# Patient Record
Sex: MALE | Race: WHITE | Employment: OTHER | URBAN - METROPOLITAN AREA
[De-identification: names, ages, dates, MRNs, and addresses within clinical notes are randomized per-mention and may not be internally consistent; named-entity substitution may affect disease eponyms.]

---

## 2021-05-14 ENCOUNTER — TRANSCRIBE ORDER (OUTPATIENT)
Dept: GENERAL RADIOLOGY | Age: 86
End: 2021-05-14

## 2021-05-14 ENCOUNTER — HOSPITAL ENCOUNTER (OUTPATIENT)
Dept: ULTRASOUND IMAGING | Age: 86
Discharge: HOME | End: 2021-05-14
Attending: INTERNAL MEDICINE
Payer: MEDICARE

## 2021-05-14 DIAGNOSIS — R22.42 MASS OF LOWER LEG, LEFT: ICD-10-CM

## 2021-05-14 DIAGNOSIS — D69.6 THROMBOCYTOPENIA, UNSPECIFIED (HCC): ICD-10-CM

## 2021-05-14 DIAGNOSIS — D69.6 THROMBOCYTOPENIA, UNSPECIFIED (HCC): Primary | ICD-10-CM

## 2021-05-14 PROCEDURE — 93971 EXTREMITY STUDY: CPT

## 2021-05-24 ENCOUNTER — TRANSCRIBE ORDER (OUTPATIENT)
Dept: SCHEDULING | Age: 86
End: 2021-05-24

## 2021-05-24 DIAGNOSIS — M79.89 PAIN AND SWELLING OF RIGHT LOWER EXTREMITY: Primary | ICD-10-CM

## 2021-05-24 DIAGNOSIS — M79.604 PAIN AND SWELLING OF RIGHT LOWER EXTREMITY: Primary | ICD-10-CM

## 2021-05-28 ENCOUNTER — HOSPITAL ENCOUNTER (OUTPATIENT)
Dept: ULTRASOUND IMAGING | Age: 86
Discharge: HOME | End: 2021-05-28
Attending: INTERNAL MEDICINE
Payer: MEDICARE

## 2021-05-28 DIAGNOSIS — M79.604 PAIN AND SWELLING OF RIGHT LOWER EXTREMITY: ICD-10-CM

## 2021-05-28 DIAGNOSIS — M79.89 PAIN AND SWELLING OF RIGHT LOWER EXTREMITY: ICD-10-CM

## 2021-05-28 PROCEDURE — 93971 EXTREMITY STUDY: CPT

## 2021-08-03 ENCOUNTER — HOSPITAL ENCOUNTER (INPATIENT)
Age: 86
LOS: 1 days | Discharge: HOME | DRG: 309 | End: 2021-08-04
Attending: EMERGENCY MEDICINE | Admitting: INTERNAL MEDICINE
Payer: MEDICARE

## 2021-08-03 ENCOUNTER — APPOINTMENT (OUTPATIENT)
Dept: CT IMAGING | Age: 86
DRG: 309 | End: 2021-08-03
Attending: EMERGENCY MEDICINE
Payer: MEDICARE

## 2021-08-03 DIAGNOSIS — K86.1 CHRONIC PANCREATITIS, UNSPECIFIED PANCREATITIS TYPE (HCC): ICD-10-CM

## 2021-08-03 DIAGNOSIS — D69.6 THROMBOCYTOPENIA (HCC): ICD-10-CM

## 2021-08-03 DIAGNOSIS — K80.20 CALCULUS OF GALLBLADDER WITHOUT CHOLECYSTITIS WITHOUT OBSTRUCTION: ICD-10-CM

## 2021-08-03 DIAGNOSIS — R91.8 PULMONARY NODULES: ICD-10-CM

## 2021-08-03 DIAGNOSIS — R10.32 ABDOMINAL PAIN, LLQ (LEFT LOWER QUADRANT): ICD-10-CM

## 2021-08-03 DIAGNOSIS — I48.91 ATRIAL FIBRILLATION WITH SLOW VENTRICULAR RESPONSE (HCC): Primary | ICD-10-CM

## 2021-08-03 PROBLEM — R00.1 BRADYCARDIA: Status: ACTIVE | Noted: 2021-08-03

## 2021-08-03 LAB
ALBUMIN SERPL-MCNC: 3.6 G/DL (ref 3.5–5)
ALBUMIN/GLOB SERPL: 1.2 {RATIO} (ref 1.1–2.2)
ALP SERPL-CCNC: 68 U/L (ref 45–117)
ALT SERPL-CCNC: 20 U/L (ref 12–78)
ANION GAP SERPL CALC-SCNC: 7 MMOL/L (ref 5–15)
AST SERPL-CCNC: 18 U/L (ref 15–37)
BASOPHILS # BLD: 0 K/UL (ref 0–0.1)
BASOPHILS NFR BLD: 0 % (ref 0–1)
BILIRUB SERPL-MCNC: 1.1 MG/DL (ref 0.2–1)
BUN SERPL-MCNC: 18 MG/DL (ref 6–20)
BUN/CREAT SERPL: 16 (ref 12–20)
CALCIUM SERPL-MCNC: 8.9 MG/DL (ref 8.5–10.1)
CHLORIDE SERPL-SCNC: 101 MMOL/L (ref 97–108)
CO2 SERPL-SCNC: 28 MMOL/L (ref 21–32)
CREAT SERPL-MCNC: 1.16 MG/DL (ref 0.7–1.3)
DIFFERENTIAL METHOD BLD: ABNORMAL
EOSINOPHIL # BLD: 0.2 K/UL (ref 0–0.4)
EOSINOPHIL NFR BLD: 3 % (ref 0–7)
ERYTHROCYTE [DISTWIDTH] IN BLOOD BY AUTOMATED COUNT: 13.4 % (ref 11.5–14.5)
GLOBULIN SER CALC-MCNC: 3.1 G/DL (ref 2–4)
GLUCOSE SERPL-MCNC: 99 MG/DL (ref 65–100)
HCT VFR BLD AUTO: 42.6 % (ref 36.6–50.3)
HGB BLD-MCNC: 14.2 G/DL (ref 12.1–17)
IMM GRANULOCYTES # BLD AUTO: 0 K/UL (ref 0–0.04)
IMM GRANULOCYTES NFR BLD AUTO: 0 % (ref 0–0.5)
LACTATE SERPL-SCNC: 0.6 MMOL/L (ref 0.4–2)
LIPASE SERPL-CCNC: 67 U/L (ref 73–393)
LYMPHOCYTES # BLD: 1.1 K/UL (ref 0.8–3.5)
LYMPHOCYTES NFR BLD: 20 % (ref 12–49)
MCH RBC QN AUTO: 32.4 PG (ref 26–34)
MCHC RBC AUTO-ENTMCNC: 33.3 G/DL (ref 30–36.5)
MCV RBC AUTO: 97.3 FL (ref 80–99)
MONOCYTES # BLD: 0.5 K/UL (ref 0–1)
MONOCYTES NFR BLD: 9 % (ref 5–13)
NEUTS SEG # BLD: 3.7 K/UL (ref 1.8–8)
NEUTS SEG NFR BLD: 68 % (ref 32–75)
NRBC # BLD: 0 K/UL (ref 0–0.01)
NRBC BLD-RTO: 0 PER 100 WBC
PLATELET # BLD AUTO: 88 K/UL (ref 150–400)
PMV BLD AUTO: 12.5 FL (ref 8.9–12.9)
POTASSIUM SERPL-SCNC: 4 MMOL/L (ref 3.5–5.1)
PROT SERPL-MCNC: 6.7 G/DL (ref 6.4–8.2)
RBC # BLD AUTO: 4.38 M/UL (ref 4.1–5.7)
RBC MORPH BLD: ABNORMAL
SODIUM SERPL-SCNC: 136 MMOL/L (ref 136–145)
TROPONIN I SERPL-MCNC: <0.05 NG/ML
WBC # BLD AUTO: 5.5 K/UL (ref 4.1–11.1)

## 2021-08-03 PROCEDURE — 84484 ASSAY OF TROPONIN QUANT: CPT

## 2021-08-03 PROCEDURE — 85025 COMPLETE CBC W/AUTO DIFF WBC: CPT

## 2021-08-03 PROCEDURE — 74177 CT ABD & PELVIS W/CONTRAST: CPT

## 2021-08-03 PROCEDURE — 65660000001 HC RM ICU INTERMED STEPDOWN

## 2021-08-03 PROCEDURE — 74011000636 HC RX REV CODE- 636: Performed by: EMERGENCY MEDICINE

## 2021-08-03 PROCEDURE — 83605 ASSAY OF LACTIC ACID: CPT

## 2021-08-03 PROCEDURE — 99284 EMERGENCY DEPT VISIT MOD MDM: CPT

## 2021-08-03 PROCEDURE — 93005 ELECTROCARDIOGRAM TRACING: CPT

## 2021-08-03 PROCEDURE — 83690 ASSAY OF LIPASE: CPT

## 2021-08-03 PROCEDURE — 80053 COMPREHEN METABOLIC PANEL: CPT

## 2021-08-03 RX ADMIN — IOPAMIDOL 100 ML: 755 INJECTION, SOLUTION INTRAVENOUS at 21:03

## 2021-08-03 NOTE — ED TRIAGE NOTES
TRIAGE NOTE: Patient arrived from home with c/o LEFT lower abd pain for the past week. Denies N/V/D. Patient first sent patient over here to rule out diverticulitis.

## 2021-08-03 NOTE — ED PROVIDER NOTES
24-year-old male with history of atrial fibrillation and DVT, taking Xarelto, was sent here by patient first for imaging of his abdomen because he has had abdominal pain since March in the left lower quadrant. They tried a course of antibiotics back then and he had some relief. It was presumed he may have had diverticulitis. Shortly after the antibiotics, the pain returned and has come and gone with no lasting relief. No fever, no cough, no chest pain, no shortness of breath, no lightheadedness or dizziness, no nausea or vomiting or diarrhea. No unintentional weight loss. No blood in his stool. Normal bowel movements. Able to eat without problems. 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 His heart rate has been in the 30s and 40s while he has been here, but he says he has no symptoms from that. His cardiologist is Dr. Marvel Metcalf           Past Medical History:   Diagnosis Date    Atrial fibrillation [facility])        Past Surgical History:   Procedure Laterality Date    HX CHOLECYSTECTOMY      HX TONSIL AND ADENOIDECTOMY           History reviewed. No pertinent family history.     Social History     Socioeconomic History    Marital status:      Spouse name: Not on file    Number of children: Not on file    Years of education: Not on file    Highest education level: Not on file   Occupational History    Not on file   Tobacco Use    Smoking status: Never Smoker    Smokeless tobacco: Never Used   Substance and Sexual Activity    Alcohol use: Never    Drug use: Not on file    Sexual activity: Not on file   Other Topics Concern    Not on file   Social History Narrative    Not on file     Social Determinants of Health     Financial Resource Strain:     Difficulty of Paying Living Expenses:    Food Insecurity:     Worried About Running Out of Food in the Last Year:     [address] in the Last Year: Transportation Needs:     Lack of Transportation (Medical):  Lack of Transportation (Non-Medical):    Physical Activity:     Days of Exercise per Week:     Minutes of Exercise per Session:    Stress:     Feeling of Stress :    Social Connections:     Frequency of Communication with Friends and Family:     Frequency of Social Gatherings with Friends and Family:     Attends [employer] Services:     Active Member of Clubs or Organizations:     Attends Club or Organization Meetings:     Marital Status:    Intimate Partner Violence:     Fear of Current or Ex-Partner:     Emotionally Abused:     Physically Abused:     Sexually Abused: ALLERGIES: Patient has no known allergies. Review of Systems   Constitutional: Negative for fever. HENT: Negative for trouble swallowing. Eyes: Negative for visual disturbance. Respiratory: Negative for cough. Cardiovascular: Negative for chest pain. Gastrointestinal: Positive for abdominal pain. Genitourinary: Negative for difficulty urinating. Musculoskeletal: Negative for gait problem. Skin: Negative for rash. Neurological: Negative for headaches. Hematological: Bruises/bleeds easily. Psychiatric/Behavioral: Negative for sleep disturbance. Vitals:    08/03/21 1907   BP: (!) 152/75   Pulse: (!) 44   Resp: 17   Temp: 97.5 °F (36.4 °C)   SpO2: 99%   Weight: 85.4 kg (188 lb 4.4 oz)   Height: 5' 8\" (1.727 m)            Physical Exam  Constitutional:       Appearance: Normal appearance. HENT:      Head: Normocephalic. Nose: Nose normal.      Mouth/Throat:      Mouth: Mucous membranes are moist.   Eyes:      Extraocular Movements: Extraocular movements intact. Conjunctiva/sclera: Conjunctivae normal.   Cardiovascular:      Rate and Rhythm: Bradycardia present. Rhythm irregular. Pulmonary:      Effort: Pulmonary effort is normal. No respiratory distress. Abdominal:      General: Abdomen is flat.       Palpations: Abdomen is soft. Tenderness: There is guarding. Musculoskeletal:         General: Normal range of motion. Skin:     Findings: No rash. Neurological:      General: No focal deficit present. Mental Status: He is alert. Psychiatric:         Behavior: Behavior normal.          MDM  Number of Diagnoses or Management Options  Abdominal pain, LLQ (left lower quadrant)  Atrial fibrillation with slow ventricular response (HCC)  Calculus of gallbladder without cholecystitis without obstruction  Chronic pancreatitis, unspecified pancreatitis type (HCC)  Pulmonary nodules  Thrombocytopenia (HCC)  Diagnosis management comments: EKG at 1913  Atrial fibrillation with slow ventricular response and normal axis at a rate of 38  QRS and QTc within normal limits  No ST changes  Nonischemic    No significant tenderness on exam, but pain for this length of time is concerning. I will obtain a CT scan to rule out any serious pathology and I will also obtain a lactic acid to look for any evidence of mesenteric ischemia. Patient is well-appearing despite his extremely low heart rate. He does not seem to have symptoms from this, so I plan to discuss it with his cardiologist. I put in a page to her.       [facility] Consult for Admission  10:49 PM    ED Room Number: SER01/01  Patient Name and age:  Loreto Martínez 80 y.o.  male  Working Diagnosis: Atrial fibrillation with slow ventricular response ([address]  (primary encounter diagnosis)  Abdominal pain, LLQ (left lower quadrant)  Thrombocytopenia ([address]  Calculus of gallbladder without cholecystitis without obstruction  Chronic pancreatitis, unspecified pancreatitis type ([address]  Pulmonary nodules    COVID-19 Suspicion:  no  Sepsis present:  no  Reassessment needed: no  Code Status:  Full Code  Readmission: no  Isolation Requirements:  no  Recommended Level of Care:  telemetry  Department:[facility] - [phone]  Other: Patient came for abdominal pain, but is being admitted for heart rate in the 30s.   I spoke with Dr. Bella Nur cardiology, who recommended admission for further evaluation of his bradycardia and possibly pacer placement         Procedures

## 2021-08-04 ENCOUNTER — APPOINTMENT (OUTPATIENT)
Dept: NON INVASIVE DIAGNOSTICS | Age: 86
DRG: 309 | End: 2021-08-04
Attending: STUDENT IN AN ORGANIZED HEALTH CARE EDUCATION/TRAINING PROGRAM
Payer: MEDICARE

## 2021-08-04 VITALS
WEIGHT: 179 LBS | BODY MASS INDEX: 27.13 KG/M2 | RESPIRATION RATE: 14 BRPM | TEMPERATURE: 97.6 F | SYSTOLIC BLOOD PRESSURE: 123 MMHG | OXYGEN SATURATION: 98 % | HEIGHT: 68 IN | DIASTOLIC BLOOD PRESSURE: 58 MMHG | HEART RATE: 43 BPM

## 2021-08-04 PROBLEM — I48.91 ATRIAL FIBRILLATION (HCC): Status: ACTIVE | Noted: 2021-08-04

## 2021-08-04 LAB
AMPHET UR QL SCN: NEGATIVE
ATRIAL RATE: 357 BPM
BARBITURATES UR QL SCN: NEGATIVE
BENZODIAZ UR QL: NEGATIVE
CALCULATED R AXIS, ECG10: -22 DEGREES
CALCULATED T AXIS, ECG11: 55 DEGREES
CANNABINOIDS UR QL SCN: NEGATIVE
COCAINE UR QL SCN: NEGATIVE
DIAGNOSIS, 93000: NORMAL
DRUG SCRN COMMENT,DRGCM: NORMAL
ECHO AO ROOT DIAM: 3.56 CM
ECHO AV AREA PEAK VELOCITY: 3.03 CM2
ECHO AV AREA/BSA PEAK VELOCITY: 1.6 CM2/M2
ECHO AV PEAK GRADIENT: 4.45 MMHG
ECHO AV PEAK VELOCITY: 105.47 CM/S
ECHO LA AREA 4C: 40.42 CM2
ECHO LA MAJOR AXIS: 5.42 CM
ECHO LA MINOR AXIS: 2.78 CM
ECHO LA VOL 2C: 133.23 ML (ref 18–58)
ECHO LA VOL 4C: 151.81 ML (ref 18–58)
ECHO LA VOL BP: 160.99 ML (ref 18–58)
ECHO LA VOL/BSA BIPLANE: 82.56 ML/M2 (ref 16–28)
ECHO LA VOLUME INDEX A2C: 68.32 ML/M2 (ref 16–28)
ECHO LA VOLUME INDEX A4C: 77.85 ML/M2 (ref 16–28)
ECHO LV E' LATERAL VELOCITY: 12.16 CM/S
ECHO LV E' SEPTAL VELOCITY: 7.28 CM/S
ECHO LV INTERNAL DIMENSION DIASTOLIC: 5.35 CM (ref 4.2–5.9)
ECHO LV INTERNAL DIMENSION SYSTOLIC: 3.69 CM
ECHO LV IVSD: 1.14 CM (ref 0.6–1)
ECHO LV MASS 2D: 257.5 G (ref 88–224)
ECHO LV MASS INDEX 2D: 132 G/M2 (ref 49–115)
ECHO LV POSTERIOR WALL DIASTOLIC: 1.24 CM (ref 0.6–1)
ECHO LVOT DIAM: 2.41 CM
ECHO LVOT PEAK GRADIENT: 1.96 MMHG
ECHO LVOT PEAK VELOCITY: 69.98 CM/S
ECHO MV A VELOCITY: 41.46 CM/S
ECHO MV AREA PHT: 4.48 CM2
ECHO MV E DECELERATION TIME (DT): 169.3 MS
ECHO MV E VELOCITY: 94.6 CM/S
ECHO MV E/A RATIO: 2.28
ECHO MV E/E' LATERAL: 7.78
ECHO MV E/E' RATIO (AVERAGED): 10.39
ECHO MV E/E' SEPTAL: 12.99
ECHO MV PRESSURE HALF TIME (PHT): 49.1 MS
ECHO PV MAX VELOCITY: 89.82 CM/S
ECHO PV PEAK INSTANTANEOUS GRADIENT SYSTOLIC: 3.23 MMHG
ECHO RV INTERNAL DIMENSION: 5.61 CM
ECHO RV TAPSE: 2.26 CM (ref 1.5–2)
ECHO TV REGURGITANT MAX VELOCITY: 361.05 CM/S
ECHO TV REGURGITANT PEAK GRADIENT: 52.14 MMHG
MAGNESIUM SERPL-MCNC: 2 MG/DL (ref 1.6–2.4)
METHADONE UR QL: NEGATIVE
OPIATES UR QL: NEGATIVE
PCP UR QL: NEGATIVE
PHOSPHATE SERPL-MCNC: 3.3 MG/DL (ref 2.6–4.7)
Q-T INTERVAL, ECG07: 508 MS
QRS DURATION, ECG06: 100 MS
QTC CALCULATION (BEZET), ECG08: 403 MS
TSH SERPL DL<=0.05 MIU/L-ACNC: 7.89 UIU/ML (ref 0.36–3.74)
VENTRICULAR RATE, ECG03: 38 BPM

## 2021-08-04 PROCEDURE — 74011250637 HC RX REV CODE- 250/637: Performed by: INTERNAL MEDICINE

## 2021-08-04 PROCEDURE — 36415 COLL VENOUS BLD VENIPUNCTURE: CPT

## 2021-08-04 PROCEDURE — 93306 TTE W/DOPPLER COMPLETE: CPT

## 2021-08-04 PROCEDURE — 74011250636 HC RX REV CODE- 250/636: Performed by: STUDENT IN AN ORGANIZED HEALTH CARE EDUCATION/TRAINING PROGRAM

## 2021-08-04 PROCEDURE — 84443 ASSAY THYROID STIM HORMONE: CPT

## 2021-08-04 PROCEDURE — 83735 ASSAY OF MAGNESIUM: CPT

## 2021-08-04 PROCEDURE — 84100 ASSAY OF PHOSPHORUS: CPT

## 2021-08-04 PROCEDURE — 80307 DRUG TEST PRSMV CHEM ANLYZR: CPT

## 2021-08-04 RX ORDER — SODIUM CHLORIDE 9 MG/ML
100 INJECTION, SOLUTION INTRAVENOUS CONTINUOUS
Status: DISCONTINUED | OUTPATIENT
Start: 2021-08-04 | End: 2021-08-04 | Stop reason: HOSPADM

## 2021-08-04 RX ORDER — ONDANSETRON 4 MG/1
4 TABLET, ORALLY DISINTEGRATING ORAL
Status: DISCONTINUED | OUTPATIENT
Start: 2021-08-04 | End: 2021-08-04 | Stop reason: HOSPADM

## 2021-08-04 RX ORDER — ACETAMINOPHEN 325 MG/1
650 TABLET ORAL
Status: DISCONTINUED | OUTPATIENT
Start: 2021-08-04 | End: 2021-08-04 | Stop reason: HOSPADM

## 2021-08-04 RX ORDER — SODIUM CHLORIDE 0.9 % (FLUSH) 0.9 %
5-40 SYRINGE (ML) INJECTION AS NEEDED
Status: DISCONTINUED | OUTPATIENT
Start: 2021-08-04 | End: 2021-08-04 | Stop reason: HOSPADM

## 2021-08-04 RX ORDER — ONDANSETRON 2 MG/ML
4 INJECTION INTRAMUSCULAR; INTRAVENOUS
Status: DISCONTINUED | OUTPATIENT
Start: 2021-08-04 | End: 2021-08-04 | Stop reason: HOSPADM

## 2021-08-04 RX ORDER — POLYETHYLENE GLYCOL 3350 17 G/17G
17 POWDER, FOR SOLUTION ORAL DAILY PRN
Status: DISCONTINUED | OUTPATIENT
Start: 2021-08-04 | End: 2021-08-04 | Stop reason: HOSPADM

## 2021-08-04 RX ORDER — SODIUM CHLORIDE 0.9 % (FLUSH) 0.9 %
5-40 SYRINGE (ML) INJECTION EVERY 8 HOURS
Status: DISCONTINUED | OUTPATIENT
Start: 2021-08-04 | End: 2021-08-04 | Stop reason: HOSPADM

## 2021-08-04 RX ORDER — ACETAMINOPHEN 650 MG/1
650 SUPPOSITORY RECTAL
Status: DISCONTINUED | OUTPATIENT
Start: 2021-08-04 | End: 2021-08-04 | Stop reason: HOSPADM

## 2021-08-04 RX ADMIN — RIVAROXABAN 15 MG: 15 TABLET, FILM COATED ORAL at 11:22

## 2021-08-04 RX ADMIN — SODIUM CHLORIDE 100 ML/HR: 9 INJECTION, SOLUTION INTRAVENOUS at 04:49

## 2021-08-04 NOTE — DISCHARGE SUMMARY
Discharge Summary       PATIENT ID: Modesto Schmidt  MRN: [id number]   YOB: 1932    DATE OF ADMISSION: 8/3/2021  6:51 PM    DATE OF DISCHARGE: 08/04/2021   PRIMARY CARE PROVIDER: None     DISCHARGING PROVIDER: Joshua Mora MD    To contact this individual call [phone] and ask the  to page. If unavailable ask to be transferred the Adult Hospitalist Department. CONSULTATIONS: IP CONSULT TO CARDIOLOGY    PROCEDURES/SURGERIES: * No surgery found *    ADMITTING DIAGNOSES & HOSPITAL COURSE:   Afib with slow ventricular response  Abdominal pain -resolved     Modesto Schmidt is a 80 y.o. male w/ hx of afib on xarelto who presented to er with complaints of abdominal pain. Reported intermittent lmq abdominal pain which prompted him to seek er care due to concerns of diverticulitis. At time of my consultation, denied any symptoms. Was incidentally found to be bradycardic w/ hr to 30s. Denies any symptoms at this time. 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 The patient denies any fever, chills, chest pain, cough, congestion, recent illness, palpitations, or dysuria. Patient admitted and monitored. Cardiology evaluated, and no indication for intervention. Pt is cleared for DC home. DISCHARGE DIAGNOSES / PLAN:         Atrial Fibrillation w/ Slow Ventricular Response  -Repeat TSH, CMP, Mag, Phos, UDS  -Monitor on telemetry  -ECHO in AM  -Cardiology consult     Abdominal Pain  -No clear abdominal pathology on ct  -Asymptomatic. Monitor     Chronic cholelithiasis w/o cholecystitis,  Chronic calcific pancreatitis  Sub centimeter nodules in lung bases  -Outpatient PCP follow-up        ADDITIONAL CARE RECOMMENDATIONS:   - Please take all medications as prescribed. Note changes as below. **no changes    - Please make sure to follow up with your primary care physician within 1-2 weeks of discharge for hospital follow up.    - Please make sure to continue to monitor for: Chest pain, shortness of breath, high fevers, feeling like you are going to pass out and return to the Emergency Department with any of these symptoms.   - Please get up slowly from a seated or laying position, avoid falls. - Avoid tobacco, alcohol and other illicit drug use. - Diet restrictions: None  - Activity restrictions: None        PENDING TEST RESULTS:   At the time of discharge the following test results are still pending: None    FOLLOW UP APPOINTMENTS:    Follow-up Information     Follow up With Specialties Details Why Contact Info    Primary care physician    As previously scheduled. DIET: Resume previous diet     ACTIVITY: Activity as tolerated    WOUND CARE: None    EQUIPMENT needed: None      DISCHARGE MEDICATIONS:  Current Discharge Medication List      CONTINUE these medications which have NOT CHANGED    Details   rivaroxaban (Xarelto) 10 mg tablet Take 10 mg by mouth daily. NOTIFY YOUR PHYSICIAN FOR ANY OF THE FOLLOWING:   Fever over 101 degrees for 24 hours. Chest pain, shortness of breath, fever, chills, nausea, vomiting, diarrhea, change in mentation, falling, weakness, bleeding. Severe pain or pain not relieved by medications. Or, any other signs or symptoms that you may have questions about.     DISPOSITION:   X Home With:   OT  PT  HH  RN       Long term SNF/Inpatient Rehab    Independent/assisted living    Hospice    Other:       PATIENT CONDITION AT DISCHARGE:     Functional status    Poor     Deconditioned    X Independent      Cognition    X Lucid     Forgetful     Dementia      Catheters/lines (plus indication)    [facility]     PICC     PEG    X None      Code status   X  Full code     DNR      PHYSICAL EXAMINATION AT DISCHARGE:  Visit Vitals  BP (!) 123/58   Pulse (!) 43   Temp 97.6 °F (36.4 °C)   Resp 14   Ht 5' 8\" (1.727 m)   Wt 81.2 kg (179 lb)   SpO2 98%   BMI 27.22 kg/m²     Gen: NAD, awake in bed  HEENT: NC/AT, sclera anicteric, PERRL, EOMI  CV: Irregular, bradycardic. no m/r/g, normal S1 and S2, no pedal edema   Resp: CTA b/l no increased work of breathing, no wheezing or rhonchi, speaking in full sentences   Abd: NT/ND, normal bowel sounds, no rebound or guarding  Ext: 2+ pulses, no edema  Skin: No rashes or lesions        CHRONIC MEDICAL DIAGNOSES:  Problem List as of 8/4/2021 Date Reviewed: 8/4/2021        Codes Class Noted - Resolved    Atrial fibrillation ([facility] [address] [facility] ICD-10-CM: I48.91  ICD-9-CM: 427.31  8/4/2021 - Present        Bradycardia ICD-10-CM: R00.1  ICD-9-CM: 427.89  8/3/2021 - Present              Greater than 30 minutes were spent with the patient on counseling and coordination of care    Signed:   Morena Talamantes MD  8/4/2021  12:12 PM

## 2021-08-04 NOTE — PROGRESS NOTES
Chart reviewed and attempted to see patient for OT intervention. Patient fully clothed and up ad lilly to bathroom. Patient endorsed he was independent with all aspects of ADLs and IADLs and still working. No acute OT needs at this time. Please re-consult if there is a change in patient functional status. Thank you.

## 2021-08-04 NOTE — PROGRESS NOTES
Transition of Care: home with wife and followup with specialist and new PCP (patient will make his own appointment)    Transport Plan: in car with wife    RUR: 9%    Main contact is Kaela Hobson [phone]    CM noted discharge order; no other CM needs noted    1200: this CM met with patient; he is alert and oriented x 4; he is independent in his ADLs; patient will be picked up by his wife; patient states he just moved to town recently; this CM gave him a list of [facility] PCPs; patient states he will look over list and chose a PCP and make his own appointment; no other CM needs noted    Medicare pt has received, reviewed, and signed [facility]st [facility] letter informing them of their right to appeal the discharge. Signed copy has been placed on pt bedside chart.     CM following  Lashay Tobin RN, CRM

## 2021-08-04 NOTE — H&P
History & Physical    Primary Care Provider: None  Source of Information: Patient and chart review    History of Presenting Illness:   Annika Bundy is a 80 y.o. male w/ hx of afib on xarelto who presented to er with complaints of abdominal pain. Reported intermittent lmq abdominal pain which prompted him to seek er care due to concerns of diverticulitis. At time of my consultation, denied any symptoms. Was incidentally found to be bradycardic w/ hr to 30s. Denies any symptoms at this time. 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 The patient denies any fever, chills, chest pain, cough, congestion, recent illness, palpitations, or dysuria. Vitals on ER presentation remarkable for bradycardia with HR to 32 and BPs to 161/104  Labs remarkable for plt of 88  CT abd et pelv w/ contrast showed no acute pathology. Chronic cholelithiasis w/o cholecystitis, chronic calcific pancreatitis, multiple sub centimeter nodules in lung bases. Review of Systems:  A comprehensive review of systems was negative except for that written in the History of Present Illness. Past Medical History:   Diagnosis Date    Atrial fibrillation [facility])       Past Surgical History:   Procedure Laterality Date    HX CHOLECYSTECTOMY      HX TONSIL AND ADENOIDECTOMY       Prior to Admission medications    Medication Sig Start Date End Date Taking? Authorizing Provider   rivaroxaban (Xarelto) 10 mg tablet Take 10 mg by mouth daily. Yes Other, MD Charlette     No Known Allergies   History reviewed. No pertinent family history.      SOCIAL HISTORY:  Patient resides:  Independently X   Assisted Living    SNF    With family care       Smoking history:   None X   Former    Chronic      Alcohol history:   None X   Social    Chronic      Ambulates:   Independently X   w/cane    w/walker    w/wc    CODE STATUS:  DNR    Full X   Other      Objective:     Physical Exam:     Visit Vitals  BP (!) 162/61 (BP 1 Location: Left upper arm, BP Patient Position: At rest)   Pulse (!) 45   Temp 98.7 °F (37.1 °C)   Resp 15   Ht 5' 8\" (1.727 m)   Wt 85.4 kg (188 lb 4.4 oz)   SpO2 98%   BMI 28.63 kg/m²      O2 Device: None (Room air)    General:  Alert, cooperative, no distress, appears stated age. Head:  Normocephalic, without obvious abnormality, atraumatic. Eyes:  Conjunctivae/corneas clear. PERRL, EOMs intact. Nose: Nares normal. Septum midline. Mucosa normal.        Neck: Supple, symmetrical, trachea midline, no carotid bruit and no JVD. Lungs:   Clear to auscultation bilaterally. Chest wall:  No tenderness or deformity. Heart:  Regular irregular, bradycardic, S1, S2 normal, no murmur, click, rub or gallop. Abdomen:   Soft, non-tender. Bowel sounds normal. No masses,  No organomegaly. Extremities: Extremities normal, atraumatic, no cyanosis or edema. Pulses: 2+ and symmetric all extremities. Skin: Skin color, texture, turgor normal. No rashes or lesions   Neurologic: CNII-XII intact. EKG:  afib w/ SVR  Data Review:     Recent Days:  Recent Labs     08/03/21 1914   WBC 5.5   HGB 14.2   HCT 42.6   PLT 88*     Recent Labs     08/03/21 1914      K 4.0      CO2 28   GLU 99   BUN 18   CREA 1.16   CA 8.9   ALB 3.6   ALT 20     No results for input(s): PH, PCO2, PO2, HCO3, FIO2 in the last 72 hours.     24 Hour Results:  Recent Results (from the past 24 hour(s))   EKG, 12 LEAD, INITIAL    Collection Time: 08/03/21  7:13 PM   Result Value Ref Range    Ventricular Rate 38 BPM    Atrial Rate 357 BPM    QRS Duration 100 ms    Q-T Interval 508 ms    QTC Calculation (Bezet) 403 ms    Calculated R Axis -22 degrees    Calculated T Axis 55 degrees    Diagnosis       Atrial fibrillation with slow ventricular response  Abnormal ECG  No previous ECGs available     LACTIC ACID    Collection Time: 08/03/21  7:14 PM   Result Value Ref Range    Lactic acid 0.6 0.4 - 2.0 MMOL/L CBC WITH AUTOMATED DIFF    Collection Time: 08/03/21  7:14 PM   Result Value Ref Range    WBC 5.5 4.1 - 11.1 K/uL    RBC 4.38 4.10 - 5.70 M/uL    HGB 14.2 12.1 - 17.0 g/dL    HCT 42.6 36.6 - 50.3 %    MCV 97.3 80.0 - 99.0 FL    MCH 32.4 26.0 - 34.0 PG    MCHC 33.3 30.0 - 36.5 g/dL    RDW 13.4 11.5 - 14.5 %    PLATELET 88 (L) 356 - 400 K/uL    MPV 12.5 8.9 - 12.9 FL    NRBC 0.0 0  WBC    ABSOLUTE NRBC 0.00 0.00 - 0.01 K/uL    NEUTROPHILS 68 32 - 75 %    LYMPHOCYTES 20 12 - 49 %    MONOCYTES 9 5 - 13 %    EOSINOPHILS 3 0 - 7 %    BASOPHILS 0 0 - 1 %    IMMATURE GRANULOCYTES 0 0.0 - 0.5 %    ABS. NEUTROPHILS 3.7 1.8 - 8.0 K/UL    ABS. LYMPHOCYTES 1.1 0.8 - 3.5 K/UL    ABS. MONOCYTES 0.5 0.0 - 1.0 K/UL    ABS. EOSINOPHILS 0.2 0.0 - 0.4 K/UL    ABS. BASOPHILS 0.0 0.0 - 0.1 K/UL    ABS. IMM. GRANS. 0.0 0.00 - 0.04 K/UL    DF SMEAR SCANNED      RBC COMMENTS NORMOCYTIC, NORMOCHROMIC     LIPASE    Collection Time: 08/03/21  7:14 PM   Result Value Ref Range    Lipase 67 (L) 73 - 390 U/L   METABOLIC PANEL, COMPREHENSIVE    Collection Time: 08/03/21  7:14 PM   Result Value Ref Range    Sodium 136 136 - 145 mmol/L    Potassium 4.0 3.5 - 5.1 mmol/L    Chloride 101 97 - 108 mmol/L    CO2 28 21 - 32 mmol/L    Anion gap 7 5 - 15 mmol/L    Glucose 99 65 - 100 mg/dL    BUN 18 6 - 20 MG/DL    Creatinine 1.16 0.70 - 1.30 MG/DL    BUN/Creatinine ratio 16 12 - 20      GFR est AA >60 >60 ml/min/1.73m2    GFR est non-AA 59 (L) >60 ml/min/1.73m2    Calcium 8.9 8.5 - 10.1 MG/DL    Bilirubin, total 1.1 (H) 0.2 - 1.0 MG/DL    ALT (SGPT) 20 12 - 78 U/L    AST (SGOT) 18 15 - 37 U/L    Alk.  phosphatase 68 45 - 117 U/L    Protein, total 6.7 6.4 - 8.2 g/dL    Albumin 3.6 3.5 - 5.0 g/dL    Globulin 3.1 2.0 - 4.0 g/dL    A-G Ratio 1.2 1.1 - 2.2     TROPONIN I    Collection Time: 08/03/21  7:14 PM   Result Value Ref Range    Troponin-I, Qt. <0.05 <0.05 ng/mL         Imaging:     Assessment:     Jordin Lea is a 80 y.o. male w/ hx of afib on xarelto who is admitted for afib w/ svr. Plan:       Atrial Fibrillation w/ Slow Ventricular Response  -Repeat TSH, CMP, Mag, Phos, UDS  -Monitor on telemetry  -ECHO in AM  -Cardiology consult    Abdominal Pain  -No clear abdominal pathology on ct  -Asymptomatic. Monitor    Chronic cholelithiasis w/o cholecystitis,  Chronic calcific pancreatitis  Sub centimeter nodules in lung bases  -Outpatient PCP follow-up            FEN/GI -  NPO.  NS @ 75 ml/hr  Activity - as tolerated  DVT prophylaxis - Xarelto  GI prophylaxis -  NI  Disposition - Home    CODE STATUS:  Full code       Signed By: Britany Mann MD     August 4, 2021

## 2021-08-04 NOTE — CONSULTS
S Cardiology Consult Note    Date of consult:  08/04/21  Date of admission: 8/3/2021  Primary Cardiologist: Dr Fritz Carty  Physician Requesting consult: Dr Heather Dorsey / Reason for consult: slow a.fib    History of the presenting illness:  Holley Rodriguez is a 80 y.o. with a known history of atrial fibrillation, who presented to the [facility] ER yesterday with left sided abdominal pain. He has had pain in the left lower quadrant for a couple of weeks now. It was getting worse yesterday so he decided to get it checked. While he was there, they noted his HR was very slow, so decided to transfer him to [facility] for observation. Regarding his HR he is entirely asymptomatic. No syncope / dizziness. No exercise intolerance. No chest pain. Other than is abdominal discomfort he feels fine. Past Medical History:   Diagnosis Date    Atrial fibrillation [facility])        Prior to Admission medications    Medication Sig Start Date End Date Taking? Authorizing Provider   rivaroxaban (Xarelto) 10 mg tablet Take 10 mg by mouth daily. Yes Other, MD Charlette       No Known Allergies     History reviewed. No pertinent family history.     Social History     Socioeconomic History    Marital status:      Spouse name: Not on file    Number of children: Not on file    Years of education: Not on file    Highest education level: Not on file   Occupational History    Not on file   Tobacco Use    Smoking status: Never Smoker    Smokeless tobacco: Never Used   Substance and Sexual Activity    Alcohol use: Never    Drug use: Not on file    Sexual activity: Not on file   Other Topics Concern    Not on file   Social History Narrative    Not on file     Social Determinants of Health     Financial Resource Strain:     Difficulty of Paying Living Expenses:    Food Insecurity:     Worried About Running Out of Food in the Last Year:     [address] in the Last Year:    Transportation Needs:     Lack of Transportation (Medical):  Lack of Transportation (Non-Medical):    Physical Activity:     Days of Exercise per Week:     Minutes of Exercise per Session:    Stress:     Feeling of Stress :    Social Connections:     Frequency of Communication with Friends and Family:     Frequency of Social Gatherings with Friends and Family:     Attends Jainism Services:     Active Member of Clubs or Organizations:     Attends Club or Organization Meetings:     Marital Status:    Intimate Partner Violence:     Fear of Current or Ex-Partner:     Emotionally Abused:     Physically Abused:     Sexually Abused:        Review of Systems   Constitutional: Negative for chills and fever. HENT: Negative for hearing loss and nosebleeds. Eyes: Negative for blurred vision and double vision. Respiratory: Negative for cough and sputum production. Cardiovascular: Negative for chest pain and palpitations. Gastrointestinal: Positive for abdominal pain. Negative for nausea and vomiting. Genitourinary: Negative for frequency and urgency. Musculoskeletal: Negative for back pain and joint pain. Skin: Negative for itching and rash. Neurological: Negative for dizziness, loss of consciousness, weakness and headaches. Endo/Heme/Allergies: Negative for environmental allergies. Does not bruise/bleed easily. Visit Vitals  BP (!) 176/74 (BP 1 Location: Left upper arm, BP Patient Position: Sitting)   Pulse (!) 41   Temp 97.7 °F (36.5 °C)   Resp 19   Ht 5' 8\" (1.727 m)   Wt 81.5 kg (179 lb 10.8 oz)   SpO2 96%   BMI 27.32 kg/m²     Physical Exam  HENT:      Head: Normocephalic and atraumatic. Eyes:      General: No scleral icterus. Conjunctiva/sclera: Conjunctivae normal.   Neck:      Vascular: No JVD. Cardiovascular:      Rate and Rhythm: Bradycardia present. Rhythm irregularly irregular. Heart sounds: Normal heart sounds. No murmur heard. No gallop.     Pulmonary:      Effort: Pulmonary effort is normal. No respiratory distress. Breath sounds: Normal breath sounds. No stridor. No wheezing. Abdominal:      General: There is no distension. Palpations: Abdomen is soft. Musculoskeletal:         General: No deformity. Normal range of motion. Skin:     General: Skin is warm and dry. Neurological:      Mental Status: He is alert and oriented to person, place, and time. Psychiatric:         Mood and Affect: Mood and affect normal.         Lab review:  BMP:   Lab Results   Component Value Date/Time     08/03/2021 07:14 PM    K 4.0 08/03/2021 07:14 PM     08/03/2021 07:14 PM    CO2 28 08/03/2021 07:14 PM    AGAP 7 08/03/2021 07:14 PM    GLU 99 08/03/2021 07:14 PM    BUN 18 08/03/2021 07:14 PM    CREA 1.16 08/03/2021 07:14 PM    GFRAA >60 08/03/2021 07:14 PM    GFRNA 59 (L) 08/03/2021 07:14 PM        CBC:  Lab Results   Component Value Date/Time    WBC 5.5 08/03/2021 07:14 PM    HGB 14.2 08/03/2021 07:14 PM    HCT 42.6 08/03/2021 07:14 PM    PLATELET 88 (L) 22/04/6861 07:14 PM    MCV 97.3 08/03/2021 07:14 PM       All Cardiac Markers in the last 24 hours:    Lab Results   Component Value Date/Time    TROIQ <0.05 08/03/2021 07:14 PM       No results found for: CHOL, CHOLPOCT, CHOLX, CHLST, CHOLV, HDL, HDLPOC, HDLP, LDL, LDLCPOC, LDLC, DLDLP, VLDLC, VLDL, TGLX, TRIGL, TRIGP, TGLPOCT, CHHD, CHHDX     Data review:  EKG tracing personally reviewed: Daryn, ventricular rate 38 bpm  Telemetry: currently around 45pm in nimacarmen. CT Abdo/Pelvis  IMPRESSION  1. No acute findings. 2.  Cholelithiasis without acute cholecystitis. 3.  Chronic calcific pancreatitis. 4.  Diverticulosis coli without acute diverticulitis. 5.  Multiple subcentimeter nodules in the lung bases. Consider dedicated  nonemergent CT chest on outpatient basis for more comprehensive evaluation      Assessment:    1. Abdominal pain ? Cause  2. Asymptomatic bradycardia / sick sinus syndrome  3. Permanent atrial fibrillation   4. HTN    Recommendations / Plan:    No specific therapy needed  Does not meet indication for pacemaker implant (since this is asymptomatic bradycardia)  Avoid AV david blocking agents such as BB, CCB  Continue Xarelto    Can be discharged home from CV standpoint and f/u with Dr Teresa Waite as previously scheduled. Signed:  Altagracia PARKS  [facility]  Interventional Cardiology  08/04/21

## 2021-08-04 NOTE — PROGRESS NOTES
Bedside and Verbal shift change report given to April  Rn    (oncoming nurse) by Hernesto Gomez (offgoing nurse). Report included the following information Kardex, ED Summary, Intake/Output, MAR, Recent Results and Cardiac Rhythm A-FIB.

## 2021-08-04 NOTE — PROGRESS NOTES
Problem: General Medical Care Plan  Goal: *Vital signs within specified parameters  Outcome: Resolved/Met  Goal: *Labs within defined limits  Outcome: Resolved/Met  Goal: *Absence of infection signs and symptoms  Outcome: Resolved/Met  Goal: *Optimal pain control at patient's stated goal  Outcome: Resolved/Met  Goal: *Skin integrity maintained  Outcome: Resolved/Met  Goal: *Fluid volume balance  Outcome: Resolved/Met  Goal: *Optimize nutritional status  Outcome: Resolved/Met  Goal: *Anxiety reduced or absent  Outcome: Resolved/Met  Goal: *Progressive mobility and function (eg: ADL's)  Outcome: Resolved/Met     Problem: Patient Education: Go to Patient Education Activity  Goal: Patient/Family Education  Outcome: Resolved/Met     Problem: Falls - Risk of  Goal: *Absence of Falls  Description: Document Dawson Fall Risk and appropriate interventions in the flowsheet.   Outcome: Resolved/Met     Problem: Patient Education: Go to Patient Education Activity  Goal: Patient/Family Education  Outcome: Resolved/Met     Problem: Discharge Planning  Goal: *Discharge to safe environment  Outcome: Resolved/Met  Goal: *Knowledge of medication management  Outcome: Resolved/Met  Goal: *Knowledge of discharge instructions  Outcome: Resolved/Met     Problem: Patient Education: Go to Patient Education Activity  Goal: Patient/Family Education  Outcome: Resolved/Met

## 2021-08-04 NOTE — DISCHARGE INSTRUCTIONS
Dear Patrick Eduardo,    Thank you for choosing [address] [place] [address] for your healthcare needs. We strive to provide EXCELLENT care to you and your family. In an effort to explain clearly why you were here in the hospital, I've also written a very brief summary below. Other details including formal diagnosis, medication changes, and follow up appointment recommendations can also be found in this packet. You were admitted for slow heart rate due to physiologic (has been this way for a long time) for which you closely monitored. You also received care from specialist physicians in the following specialties:  [address]    Here are the updates to your medication list:  **no changes      Remember that it is important for you to take your medications exactly as they are prescribed. It is helpful to keep a list of your medication with the names, dosages, and times to be taken in your wallet. Additionally,   - Please make sure to follow up with your primary care physician within 1-2 weeks of discharge for hospital follow up. - Please make sure to continue to monitor for: Chest pain, shortness of breath, high fevers, feeling like you are going to pass out and return to the Emergency Department with any of these symptoms.   - Please get up slowly from a seated or laying position, avoid falls. - Avoid tobacco, alcohol and other illicit drug use. - Diet restrictions: None  - Activity restrictions: None    Make sure to also see your primary care doctor for follow-up. Bring these papers with you and be sure to review your medication list with your doctor. I cannot stress the importance of follow up enough. I've included the information for your follow-up appointments below: Follow-up Information     Follow up With Specialties Details Why Contact Info    Primary care physician    As previously scheduled.               Should you have any fever over 101 degrees for 24 hours, chest pain, shortness of breath, fever, chills, nausea, vomiting, diarrhea, change in mentation, falling, weakness, bleeding, or worsening pain, please seek medical attention immediately. Finally, as your discharging physician, you may be receiving a survey regarding my care. I would greatly value and appreciate your input in the survey as we strive for excellence. If you have any questions, I can be reached at [phone].   Thank you so much again for allowing me to care for you at [facility].    Respectfully yours,  Eliezer Leung MD

## 2021-08-04 NOTE — ED NOTES
Pt ambulated to BR with steady gait, waiting on admit room assignment. Pt updated on POC, verbalizes knowledge.

## 2021-08-26 ENCOUNTER — OFFICE VISIT (OUTPATIENT)
Dept: PRIMARY CARE CLINIC | Age: 86
End: 2021-08-26
Payer: MEDICARE

## 2021-08-26 VITALS
RESPIRATION RATE: 17 BRPM | HEIGHT: 68 IN | HEART RATE: 66 BPM | DIASTOLIC BLOOD PRESSURE: 75 MMHG | SYSTOLIC BLOOD PRESSURE: 127 MMHG | WEIGHT: 183.4 LBS | OXYGEN SATURATION: 97 % | BODY MASS INDEX: 27.8 KG/M2 | TEMPERATURE: 97.5 F

## 2021-08-26 DIAGNOSIS — G89.29 CHRONIC MIDLINE LOW BACK PAIN WITHOUT SCIATICA: ICD-10-CM

## 2021-08-26 DIAGNOSIS — K57.90 DIVERTICULOSIS: Primary | ICD-10-CM

## 2021-08-26 DIAGNOSIS — Z87.442 HISTORY OF KIDNEY STONES: ICD-10-CM

## 2021-08-26 DIAGNOSIS — M25.552 HIP PAIN, CHRONIC, LEFT: ICD-10-CM

## 2021-08-26 DIAGNOSIS — M54.50 CHRONIC MIDLINE LOW BACK PAIN WITHOUT SCIATICA: ICD-10-CM

## 2021-08-26 DIAGNOSIS — G89.29 HIP PAIN, CHRONIC, LEFT: ICD-10-CM

## 2021-08-26 DIAGNOSIS — R91.1 LUNG NODULE: ICD-10-CM

## 2021-08-26 DIAGNOSIS — I48.91 ATRIAL FIBRILLATION, UNSPECIFIED TYPE (HCC): ICD-10-CM

## 2021-08-26 DIAGNOSIS — D69.6 THROMBOCYTOPENIA (HCC): ICD-10-CM

## 2021-08-26 DIAGNOSIS — L57.0 KERATOSIS: ICD-10-CM

## 2021-08-26 DIAGNOSIS — K86.1 CHRONIC PANCREATITIS, UNSPECIFIED PANCREATITIS TYPE (HCC): ICD-10-CM

## 2021-08-26 DIAGNOSIS — K80.20 GALLSTONES: ICD-10-CM

## 2021-08-26 DIAGNOSIS — N40.0 BENIGN PROSTATIC HYPERPLASIA, UNSPECIFIED WHETHER LOWER URINARY TRACT SYMPTOMS PRESENT: ICD-10-CM

## 2021-08-26 DIAGNOSIS — J06.9 VIRAL URI: ICD-10-CM

## 2021-08-26 DIAGNOSIS — R10.32 LEFT LOWER QUADRANT ABDOMINAL PAIN: ICD-10-CM

## 2021-08-26 DIAGNOSIS — Z87.39 HISTORY OF GOUT: ICD-10-CM

## 2021-08-26 LAB
BILIRUB UR QL STRIP: NEGATIVE
GLUCOSE UR-MCNC: NEGATIVE MG/DL
KETONES P FAST UR STRIP-MCNC: NEGATIVE MG/DL
PH UR STRIP: 8 [PH] (ref 4.6–8)
PROT UR QL STRIP: NEGATIVE
SP GR UR STRIP: 1.02 (ref 1–1.03)
UA UROBILINOGEN AMB POC: NORMAL (ref 0.2–1)
URINALYSIS CLARITY POC: CLEAR
URINALYSIS COLOR POC: YELLOW
URINE BLOOD POC: NEGATIVE
URINE LEUKOCYTES POC: NEGATIVE
URINE NITRITES POC: NEGATIVE

## 2021-08-26 PROCEDURE — 99204 OFFICE O/P NEW MOD 45 MIN: CPT | Performed by: INTERNAL MEDICINE

## 2021-08-26 PROCEDURE — G8427 DOCREV CUR MEDS BY ELIG CLIN: HCPCS | Performed by: INTERNAL MEDICINE

## 2021-08-26 PROCEDURE — 81003 URINALYSIS AUTO W/O SCOPE: CPT | Performed by: INTERNAL MEDICINE

## 2021-08-26 PROCEDURE — G8510 SCR DEP NEG, NO PLAN REQD: HCPCS | Performed by: INTERNAL MEDICINE

## 2021-08-26 PROCEDURE — 1111F DSCHRG MED/CURRENT MED MERGE: CPT | Performed by: INTERNAL MEDICINE

## 2021-08-26 PROCEDURE — G8536 NO DOC ELDER MAL SCRN: HCPCS | Performed by: INTERNAL MEDICINE

## 2021-08-26 PROCEDURE — G8419 CALC BMI OUT NRM PARAM NOF/U: HCPCS | Performed by: INTERNAL MEDICINE

## 2021-08-26 PROCEDURE — 1101F PT FALLS ASSESS-DOCD LE1/YR: CPT | Performed by: INTERNAL MEDICINE

## 2021-08-26 NOTE — PROGRESS NOTES
Jelena Alfonso is a 80 y.o.  male and presents with     Chief Complaint   Patient presents with   Jocelyn Muro Establish Care    Abdominal Pain     lingering left abd pain since 2/2021; he takes Metamucil daily; has colonoscopy every 3 years; previously hospitalized twice for diverticulitis     Other     c/o growths on back of neck, left leg, and lower left rib that are sensitive and cause irritation; requests referral to dermatologist       Pt is here to establish care. Daughter and grandkids live in [place]  Pt is a . Pt was recently admitted to hospital for left lwoer quadrant abdominal pain  Pt had CT scan that showed diverticulosis , gallstones, chronic pancreatitis and lung nodule  Pt used to smoke occasional cigar. Pt does not drink alcohol. Pt [facility] to [facility] and was found to have low heart beat who referred to ER. He was then admitted and seen by cardiology for slow Afib. Pt rates the pain at 3/10. Pt has left groin pain and by midday the pain resolves. Pt has back pain that has resolved. Pt says his platelet count has always been low. Pt has a steady pain. Pt takes metamucil , plenty of fluids  Pt has h/o gout. Had kidny stones in 1960 and 1971    Pt has nocturia times 2-3   Has enlarged prostate          Past Medical History:   Diagnosis Date    Atrial fibrillation [facility])      Past Surgical History:   Procedure Laterality Date    HX CHOLECYSTECTOMY      HX TONSIL AND ADENOIDECTOMY       Current Outpatient Medications   Medication Sig    rivaroxaban (Xarelto) 10 mg tablet Take 10 mg by mouth daily. No current facility-administered medications for this visit.      Health Maintenance   Topic Date Due    COVID-19 Vaccine (1) Never done    DTaP/Tdap/Td series (1 - Tdap) Never done    Shingrix Vaccine Age 50> (1 of 2) Never done    Pneumococcal 65+ years (1 of 1 - PPSV23) Never done    Medicare Yearly Exam  Never done    Flu Vaccine (1) 09/01/2021       There is no immunization history on file for this patient. No LMP for male patient. Allergies and Intolerances:   No Known Allergies    Family History:   No family history on file. Social History:   He  reports that he has never smoked. He has never used smokeless tobacco.  He  reports no history of alcohol use. Review of Systems:   General: negative for - chills, fatigue, fever, weight change  Psych: negative for - anxiety, depression, irritability or mood swings  ENT: negative for - headaches, hearing change, nasal congestion, oral lesions, sneezing or sore throat  Heme/ Lymph: negative for - bleeding problems, bruising, pallor or swollen lymph nodes  Endo: negative for - hot flashes, polydipsia/polyuria or temperature intolerance  Resp: negative for - cough, shortness of breath or wheezing  CV: negative for - chest pain, edema or palpitations  GI: negative for - abdominal pain, change in bowel habits, constipation, diarrhea or nausea/vomiting  : negative for - dysuria, hematuria, incontinence, pelvic pain or vulvar/vaginal symptoms  MSK: negative for - joint pain, joint swelling or muscle pain  Neuro: negative for - confusion, headaches, seizures or weakness  Derm: negative for - dry skin, hair changes, rash or skin lesion changes          Physical:   Vitals:   Vitals:    08/26/21 1314   BP: 127/75   Pulse: 66   Resp: 17   Temp: 97.5 °F (36.4 °C)   TempSrc: Temporal   SpO2: 97%   Weight: 183 lb 6.4 oz (83.2 kg)   Height: 5' 8\" (1.727 m)           Exam:   HEENT- atraumatic,normocephalic, awake, oriented, well nourished  Neck - supple,no enlarged lymph nodes, no JVD, no thyromegaly  Chest- CTA, no rhonchi, no crackles  Heart- rrr, no murmurs / gallop/rub  Abdomen- soft,BS+,NT, no hepatosplenomegaly, mild left lower quadrant and suprabpic tendeness  Ext - no c/c/edema   Neuro- no focal deficits. Power 5/5 all extremities  Skin - warm,dry, no obvious rashes, hyperkeratosis on the legs          Review of Data:   LABS: Lab Results   Component Value Date/Time    WBC 5.5 08/03/2021 07:14 PM    HGB 14.2 08/03/2021 07:14 PM    HCT 42.6 08/03/2021 07:14 PM    PLATELET 88 (L) 73/90/8310 07:14 PM     Lab Results   Component Value Date/Time    Sodium 136 08/03/2021 07:14 PM    Potassium 4.0 08/03/2021 07:14 PM    Chloride 101 08/03/2021 07:14 PM    CO2 28 08/03/2021 07:14 PM    Glucose 99 08/03/2021 07:14 PM    BUN 18 08/03/2021 07:14 PM    Creatinine 1.16 08/03/2021 07:14 PM     No results found for: CHOL, CHOLX, CHLST, CHOLV, HDL, HDLP, LDL, LDLC, DLDLP, TGLX, TRIGL, TRIGP  No components found for: GPT        Impression / Plan:        ICD-10-CM ICD-9-CM    1. Diverticulosis  K57.90 562.10    2. Chronic pancreatitis, unspecified pancreatitis type ([facility]  K86.1 577.1    3. Thrombocytopenia (HCC)  D69.6 287.5    4. Left lower quadrant abdominal pain  R10.32 789.04    5. Gallstones  K80.20 574.20    6. Atrial fibrillation, unspecified type ([facility]  I48.91 427.31    7. Viral URI  J06.9 465.9    8. Lung nodule  R91.1 793.11    9. History of gout  Z87.39 V12.29    10. History of kidney stones  Z87.442 V13.01 AMB POC URINALYSIS DIP STICK AUTO W/O MICRO   11. Benign prostatic hyperplasia, unspecified whether lower urinary tract symptoms present  N40.0 600.00    12. Keratosis  L57.0 701.1 REFERRAL TO DERMATOLOGY   13. Chronic midline low back pain without sciatica  M54.5 724.2 XR SPINE LUMB 2 OR 3 V    G89.29 338.29    14. Hip pain, chronic, left  M25.552 719.45 XR HIP LT W OR WO PELV 2-3 VWS    G89.29 338.29      UA - neg    Asked pt to make appt if he gets fever, chills , abdominal , nausea, vomiting. Pt may have mild case of diverticulitis. Pt wants to hold off on abx at this point. Explained to patient risk benefits of the medications. Advised patient to stop meds if having any side effects. Pt verbalized understanding of the instructions. I have discussed the diagnosis with the patient and the intended plan as seen in the above orders. The patient has received an after-visit summary and questions were answered concerning future plans. I have discussed medication side effects and warnings with the patient as well. I have reviewed the plan of care with the patient, accepted their input and they are in agreement with the treatment goals. Reviewed plan of care. Patient has provided input and agrees with goals. Follow-up and Dispositions    · Return in about 3 months (around 11/26/2021).          Wendy King MD

## 2021-08-26 NOTE — PROGRESS NOTES
Identified pt with two pt identifiers(name and ). Chief Complaint   Patient presents with   [employer] \[facility] [place] [facility]\"" Care    Abdominal Pain     lingering left abd pain since 2021; he takes Metamucil daily; has colonoscopy every 3 years; previously hospitalized twice for diverticulitis     Other     c/o growths on back of neck, left leg, and lower left rib that are sensitive and cause irritation; requests referral to dermatologist        3 most recent [facility] Screens 2021   Little interest or pleasure in doing things Not at all   Feeling down, depressed, irritable, or hopeless Not at all   Total Score PHQ 2 0        Vitals:    21 1314   BP: 127/75   Pulse: 66   Resp: 17   Temp: 97.5 °F (36.4 °C)   TempSrc: Temporal   SpO2: 97%   Weight: 183 lb 6.4 oz (83.2 kg)   Height: 5' 8\" (1.727 m)   PainSc:   2       Health Maintenance Due   Topic    COVID-19 Vaccine (1)    DTaP/Tdap/Td series (1 - Tdap)    Shingrix Vaccine Age 50> (1 of 2)    Pneumococcal 65+ years (1 of 1 - PPSV23)    Medicare Yearly Exam        1. Have you been to the ER, urgent care clinic since your last visit? Hospitalized since your last visit? 8/3/21 [facility] for HR of 29    2. Have you seen or consulted any other health care providers outside of the [address] [place] [address] since your last visit? Include any pap smears or colon screening.  [facility] for pain in his abdomen

## 2022-03-18 PROBLEM — R00.1 BRADYCARDIA: Status: ACTIVE | Noted: 2021-08-03

## 2022-03-19 PROBLEM — I48.91 ATRIAL FIBRILLATION (HCC): Status: ACTIVE | Noted: 2021-08-04

## 2022-09-01 ENCOUNTER — APPOINTMENT (OUTPATIENT)
Dept: GENERAL RADIOLOGY | Age: 87
DRG: 392 | End: 2022-09-01
Attending: EMERGENCY MEDICINE
Payer: MEDICARE

## 2022-09-01 ENCOUNTER — HOSPITAL ENCOUNTER (INPATIENT)
Age: 87
LOS: 1 days | Discharge: HOME | DRG: 392 | End: 2022-09-02
Attending: EMERGENCY MEDICINE | Admitting: STUDENT IN AN ORGANIZED HEALTH CARE EDUCATION/TRAINING PROGRAM
Payer: MEDICARE

## 2022-09-01 ENCOUNTER — APPOINTMENT (OUTPATIENT)
Dept: CT IMAGING | Age: 87
DRG: 392 | End: 2022-09-01
Attending: EMERGENCY MEDICINE
Payer: MEDICARE

## 2022-09-01 DIAGNOSIS — R07.9 ACUTE CHEST PAIN: Primary | ICD-10-CM

## 2022-09-01 DIAGNOSIS — I48.20 CHRONIC ATRIAL FIBRILLATION (HCC): ICD-10-CM

## 2022-09-01 LAB
ALBUMIN SERPL-MCNC: 4 G/DL (ref 3.5–5)
ALBUMIN/GLOB SERPL: 1.3 {RATIO} (ref 1.1–2.2)
ALP SERPL-CCNC: 82 U/L (ref 45–117)
ALT SERPL-CCNC: 23 U/L (ref 12–78)
ANION GAP SERPL CALC-SCNC: 12 MMOL/L (ref 5–15)
APPEARANCE UR: CLEAR
APTT PPP: 36.2 SEC (ref 22.1–31)
AST SERPL-CCNC: 28 U/L (ref 15–37)
ATRIAL RATE: 288 BPM
ATRIAL RATE: 52 BPM
ATRIAL RATE: 66 BPM
ATRIAL RATE: 79 BPM
BACTERIA URNS QL MICRO: NEGATIVE /HPF
BASOPHILS # BLD: 0 K/UL (ref 0–0.1)
BASOPHILS NFR BLD: 0 % (ref 0–1)
BILIRUB SERPL-MCNC: 1.3 MG/DL (ref 0.2–1)
BILIRUB UR QL: NEGATIVE
BNP SERPL-MCNC: 1639 PG/ML (ref 0–450)
BUN SERPL-MCNC: 21 MG/DL (ref 6–20)
BUN/CREAT SERPL: 15 (ref 12–20)
CALCIUM SERPL-MCNC: 9.3 MG/DL (ref 8.5–10.1)
CALCULATED R AXIS, ECG10: -11 DEGREES
CALCULATED R AXIS, ECG10: -12 DEGREES
CALCULATED R AXIS, ECG10: -6 DEGREES
CALCULATED R AXIS, ECG10: -9 DEGREES
CALCULATED T AXIS, ECG11: 21 DEGREES
CALCULATED T AXIS, ECG11: 33 DEGREES
CALCULATED T AXIS, ECG11: 53 DEGREES
CALCULATED T AXIS, ECG11: 54 DEGREES
CHLORIDE SERPL-SCNC: 99 MMOL/L (ref 97–108)
CO2 SERPL-SCNC: 25 MMOL/L (ref 21–32)
COLOR UR: NORMAL
CREAT BLD-MCNC: 1.08 MG/DL (ref 0.51–1.19)
CREAT SERPL-MCNC: 1.39 MG/DL (ref 0.7–1.3)
D DIMER PPP FEU-MCNC: 0.38 MG/L FEU (ref 0–0.65)
DIAGNOSIS, 93000: NORMAL
DIFFERENTIAL METHOD BLD: ABNORMAL
EOSINOPHIL # BLD: 0.1 K/UL (ref 0–0.4)
EOSINOPHIL NFR BLD: 1 % (ref 0–7)
EPITH CASTS URNS QL MICRO: NORMAL /LPF
ERYTHROCYTE [DISTWIDTH] IN BLOOD BY AUTOMATED COUNT: 13.2 % (ref 11.5–14.5)
GLOBULIN SER CALC-MCNC: 3.2 G/DL (ref 2–4)
GLUCOSE SERPL-MCNC: 123 MG/DL (ref 65–100)
GLUCOSE UR STRIP.AUTO-MCNC: NEGATIVE MG/DL
HCT VFR BLD AUTO: 45.3 % (ref 36.6–50.3)
HGB BLD-MCNC: 15.5 G/DL (ref 12.1–17)
HGB UR QL STRIP: NEGATIVE
IMM GRANULOCYTES # BLD AUTO: 0 K/UL (ref 0–0.04)
IMM GRANULOCYTES NFR BLD AUTO: 1 % (ref 0–0.5)
INR PPP: 1.5 (ref 0.9–1.1)
KETONES UR QL STRIP.AUTO: NEGATIVE MG/DL
LEUKOCYTE ESTERASE UR QL STRIP.AUTO: NEGATIVE
LYMPHOCYTES # BLD: 1.1 K/UL (ref 0.8–3.5)
LYMPHOCYTES NFR BLD: 14 % (ref 12–49)
MAGNESIUM SERPL-MCNC: 1.9 MG/DL (ref 1.6–2.4)
MCH RBC QN AUTO: 32.6 PG (ref 26–34)
MCHC RBC AUTO-ENTMCNC: 34.2 G/DL (ref 30–36.5)
MCV RBC AUTO: 95.4 FL (ref 80–99)
MONOCYTES # BLD: 0.4 K/UL (ref 0–1)
MONOCYTES NFR BLD: 6 % (ref 5–13)
NEUTS SEG # BLD: 5.9 K/UL (ref 1.8–8)
NEUTS SEG NFR BLD: 78 % (ref 32–75)
NITRITE UR QL STRIP.AUTO: NEGATIVE
NRBC # BLD: 0 K/UL (ref 0–0.01)
NRBC BLD-RTO: 0 PER 100 WBC
PH UR STRIP: 7 [PH] (ref 5–8)
PHOSPHATE SERPL-MCNC: 4 MG/DL (ref 2.6–4.7)
PLATELET # BLD AUTO: 92 K/UL (ref 150–400)
PMV BLD AUTO: 12.6 FL (ref 8.9–12.9)
POTASSIUM SERPL-SCNC: 4 MMOL/L (ref 3.5–5.1)
PROT SERPL-MCNC: 7.2 G/DL (ref 6.4–8.2)
PROT UR STRIP-MCNC: NEGATIVE MG/DL
PROTHROMBIN TIME: 14.4 SEC (ref 9–11.1)
Q-T INTERVAL, ECG07: 404 MS
Q-T INTERVAL, ECG07: 448 MS
Q-T INTERVAL, ECG07: 462 MS
Q-T INTERVAL, ECG07: 512 MS
QRS DURATION, ECG06: 102 MS
QRS DURATION, ECG06: 102 MS
QRS DURATION, ECG06: 106 MS
QRS DURATION, ECG06: 92 MS
QTC CALCULATION (BEZET), ECG08: 428 MS
QTC CALCULATION (BEZET), ECG08: 442 MS
QTC CALCULATION (BEZET), ECG08: 465 MS
QTC CALCULATION (BEZET), ECG08: 476 MS
RBC # BLD AUTO: 4.75 M/UL (ref 4.1–5.7)
RBC #/AREA URNS HPF: NORMAL /HPF (ref 0–5)
SODIUM SERPL-SCNC: 136 MMOL/L (ref 136–145)
SP GR UR REFRACTOMETRY: 1.01 (ref 1–1.03)
THERAPEUTIC RANGE,PTTT: ABNORMAL SECS (ref 58–77)
TROPONIN-HIGH SENSITIVITY: 20 NG/L (ref 0–76)
TROPONIN-HIGH SENSITIVITY: 22 NG/L (ref 0–76)
TROPONIN-HIGH SENSITIVITY: 26 NG/L (ref 0–76)
TROPONIN-HIGH SENSITIVITY: 27 NG/L (ref 0–76)
UA: UC IF INDICATED,UAUC: NORMAL
UROBILINOGEN UR QL STRIP.AUTO: 0.2 EU/DL (ref 0.2–1)
VENTRICULAR RATE, ECG03: 45 BPM
VENTRICULAR RATE, ECG03: 55 BPM
VENTRICULAR RATE, ECG03: 64 BPM
VENTRICULAR RATE, ECG03: 80 BPM
WBC # BLD AUTO: 7.5 K/UL (ref 4.1–11.1)
WBC URNS QL MICRO: NORMAL /HPF (ref 0–4)

## 2022-09-01 PROCEDURE — 74011250636 HC RX REV CODE- 250/636: Performed by: FAMILY MEDICINE

## 2022-09-01 PROCEDURE — 74011000636 HC RX REV CODE- 636: Performed by: EMERGENCY MEDICINE

## 2022-09-01 PROCEDURE — 93005 ELECTROCARDIOGRAM TRACING: CPT

## 2022-09-01 PROCEDURE — 71045 X-RAY EXAM CHEST 1 VIEW: CPT

## 2022-09-01 PROCEDURE — 74011250636 HC RX REV CODE- 250/636

## 2022-09-01 PROCEDURE — 85610 PROTHROMBIN TIME: CPT

## 2022-09-01 PROCEDURE — 36415 COLL VENOUS BLD VENIPUNCTURE: CPT

## 2022-09-01 PROCEDURE — 83880 ASSAY OF NATRIURETIC PEPTIDE: CPT

## 2022-09-01 PROCEDURE — 96374 THER/PROPH/DIAG INJ IV PUSH: CPT

## 2022-09-01 PROCEDURE — 84100 ASSAY OF PHOSPHORUS: CPT

## 2022-09-01 PROCEDURE — 74174 CTA ABD&PLVS W/CONTRAST: CPT

## 2022-09-01 PROCEDURE — 85730 THROMBOPLASTIN TIME PARTIAL: CPT

## 2022-09-01 PROCEDURE — 84484 ASSAY OF TROPONIN QUANT: CPT

## 2022-09-01 PROCEDURE — 85025 COMPLETE CBC W/AUTO DIFF WBC: CPT

## 2022-09-01 PROCEDURE — 71275 CT ANGIOGRAPHY CHEST: CPT

## 2022-09-01 PROCEDURE — 96375 TX/PRO/DX INJ NEW DRUG ADDON: CPT

## 2022-09-01 PROCEDURE — 83735 ASSAY OF MAGNESIUM: CPT

## 2022-09-01 PROCEDURE — 80053 COMPREHEN METABOLIC PANEL: CPT

## 2022-09-01 PROCEDURE — 99285 EMERGENCY DEPT VISIT HI MDM: CPT

## 2022-09-01 PROCEDURE — 74011250636 HC RX REV CODE- 250/636: Performed by: EMERGENCY MEDICINE

## 2022-09-01 PROCEDURE — 65270000046 HC RM TELEMETRY

## 2022-09-01 PROCEDURE — 81001 URINALYSIS AUTO W/SCOPE: CPT

## 2022-09-01 PROCEDURE — 82565 ASSAY OF CREATININE: CPT

## 2022-09-01 PROCEDURE — 85379 FIBRIN DEGRADATION QUANT: CPT

## 2022-09-01 RX ORDER — SODIUM CHLORIDE 0.9 % (FLUSH) 0.9 %
5-40 SYRINGE (ML) INJECTION AS NEEDED
Status: DISCONTINUED | OUTPATIENT
Start: 2022-09-01 | End: 2022-09-02 | Stop reason: HOSPADM

## 2022-09-01 RX ORDER — ONDANSETRON 2 MG/ML
8 INJECTION INTRAMUSCULAR; INTRAVENOUS
Status: COMPLETED | OUTPATIENT
Start: 2022-09-01 | End: 2022-09-01

## 2022-09-01 RX ORDER — ONDANSETRON 2 MG/ML
INJECTION INTRAMUSCULAR; INTRAVENOUS
Status: COMPLETED
Start: 2022-09-01 | End: 2022-09-01

## 2022-09-01 RX ORDER — KETOROLAC TROMETHAMINE 30 MG/ML
30 INJECTION, SOLUTION INTRAMUSCULAR; INTRAVENOUS
Status: COMPLETED | OUTPATIENT
Start: 2022-09-01 | End: 2022-09-01

## 2022-09-01 RX ORDER — HYDRALAZINE HYDROCHLORIDE 20 MG/ML
20 INJECTION INTRAMUSCULAR; INTRAVENOUS ONCE
Status: COMPLETED | OUTPATIENT
Start: 2022-09-01 | End: 2022-09-01

## 2022-09-01 RX ORDER — SODIUM CHLORIDE 9 MG/ML
50 INJECTION, SOLUTION INTRAVENOUS CONTINUOUS
Status: DISCONTINUED | OUTPATIENT
Start: 2022-09-01 | End: 2022-09-02 | Stop reason: HOSPADM

## 2022-09-01 RX ORDER — NITROGLYCERIN 0.4 MG/1
0.4 TABLET SUBLINGUAL
Status: DISCONTINUED | OUTPATIENT
Start: 2022-09-01 | End: 2022-09-02 | Stop reason: HOSPADM

## 2022-09-01 RX ORDER — ACETAMINOPHEN 325 MG/1
650 TABLET ORAL
Status: DISCONTINUED | OUTPATIENT
Start: 2022-09-01 | End: 2022-09-02 | Stop reason: HOSPADM

## 2022-09-01 RX ORDER — SODIUM CHLORIDE 0.9 % (FLUSH) 0.9 %
5-40 SYRINGE (ML) INJECTION EVERY 8 HOURS
Status: DISCONTINUED | OUTPATIENT
Start: 2022-09-01 | End: 2022-09-02 | Stop reason: HOSPADM

## 2022-09-01 RX ORDER — GUAIFENESIN 100 MG/5ML
81 LIQUID (ML) ORAL DAILY
Status: DISCONTINUED | OUTPATIENT
Start: 2022-09-01 | End: 2022-09-02 | Stop reason: HOSPADM

## 2022-09-01 RX ADMIN — ONDANSETRON HYDROCHLORIDE 8 MG: 2 SOLUTION INTRAMUSCULAR; INTRAVENOUS at 03:52

## 2022-09-01 RX ADMIN — HYDRALAZINE HYDROCHLORIDE 20 MG: 20 INJECTION INTRAMUSCULAR; INTRAVENOUS at 04:10

## 2022-09-01 RX ADMIN — ONDANSETRON 8 MG: 2 INJECTION INTRAMUSCULAR; INTRAVENOUS at 03:52

## 2022-09-01 RX ADMIN — KETOROLAC TROMETHAMINE 30 MG: 30 INJECTION, SOLUTION INTRAMUSCULAR; INTRAVENOUS at 03:52

## 2022-09-01 RX ADMIN — SODIUM CHLORIDE 1000 ML: 9 INJECTION, SOLUTION INTRAVENOUS at 03:53

## 2022-09-01 RX ADMIN — SODIUM CHLORIDE 50 ML/HR: 9 INJECTION, SOLUTION INTRAVENOUS at 11:59

## 2022-09-01 RX ADMIN — IOPAMIDOL 100 ML: 755 INJECTION, SOLUTION INTRAVENOUS at 04:42

## 2022-09-01 NOTE — ED NOTES
[facility] present at this time. Patient transferred in stable condition. Verified hospital and room assignment.

## 2022-09-01 NOTE — ED NOTES
TRANSFER - OUT REPORT:    Verbal report given to ALEXIS Guzman(name) on Holely Rodriguez  being transferred to [facility](unit) for routine progression of care       Report consisted of patients Situation, Background, Assessment and   Recommendations(SBAR). Information from the following report(s) SBAR was reviewed with the receiving nurse. Lines:   Peripheral IV 09/01/22 Left Antecubital (Active)        Opportunity for questions and clarification was provided.       Patient transported with:   Monitor

## 2022-09-01 NOTE — ED NOTES
Patient sleeping on stretcher at this time. Wakes to verbal stim. Patient denies any chest pain currently. Patient educated on room assignment. Patient ambulatory to restroom with independent gait. Denies any assistance.

## 2022-09-01 NOTE — ED TRIAGE NOTES
Pt complaining of right sided chest and abdominal pain. Pt also states he feels the pain in his back. Pt also reports nausea.  Symptoms intermittently since last Thursday

## 2022-09-01 NOTE — H&P
[address] [facility] Adult  Hospitalist Group  History and Physical    Date of Service:  9/1/2022  Primary Care Provider: None  Source of information: The patient, Chart review, and Spouse/family member    Chief Complaint: Chest Pain and Abdominal Pain      History of Presenting Illness:   Rebecca Lee is a 80 y.o. male who presents with chest pain. History was primarily obtained from the patient, patient reports that he has history of A. fib, is on Xarelto, started having pain on the right side of his chest, pain does not radiate to the back, but radiates to the right shoulder blade, patient reports that his chest pain gets better after passing gas or burping, patient got concerned, [facility], and was [facility] to the [facility] for further management and evaluation, currently patient denies any chest pain, reports that he has been compliant with his medications    The patient denies any headache, blurry vision, sore throat, trouble swallowing, trouble with speech, chest pain,  cough, fever, chills, N/V/D, abd pain, urinary symptoms, constipation, recent travels, sick contacts, focal or generalized neurological symptoms, falls, injuries, rashes, contact with COVID-19 diagnosed patients, hematemesis, melena, hemoptysis, hematuria, rashes, denies starting any new medications and denies any other concerns or problems besides as mentioned above. REVIEW OF SYSTEMS:  A comprehensive review of systems was negative except for that written in the History of Present Illness. Past Medical History:   Diagnosis Date    Atrial fibrillation [facility])       Past Surgical History:   Procedure Laterality Date    HX CHOLECYSTECTOMY      HX TONSIL AND ADENOIDECTOMY       Prior to Admission medications    Medication Sig Start Date End Date Taking? Authorizing Provider   rivaroxaban (Xarelto) 10 mg tablet Take 10 mg by mouth daily.     Other, MD Charlette     No Known Allergies   History reviewed. No pertinent family history. Social History:  reports that he has never smoked. He has never used smokeless tobacco. He reports that he does not drink alcohol and does not use drugs. Family and social history were personally reviewed, all pertinent and relevant details are outlined as above. Objective:   Visit Vitals  /65 (BP 1 Location: Right upper arm, BP Patient Position: Semi fowlers)   Pulse (!) 46   Temp 97.6 °F (36.4 °C)   Resp 12   Ht 5' 9\" (1.753 m)   Wt 79.8 kg (175 lb 14.8 oz)   SpO2 99%   BMI 25.98 kg/m²      O2 Device: None (Room air)    PHYSICAL EXAM:   General: Alert x oriented x 3, awake,   HEENT: PEERL, EOMI, moist mucus membranes  Neck: Supple, no JVD, no meningeal signs  Chest: Clear to auscultation bilaterally   CVS: RRR, S1 S2 heard, no murmurs/rubs/gallops  Abd: Soft, non-tender, non-distended, +bowel sounds   Ext: No clubbing, no cyanosis, no edema  Neuro/Psych: Pleasant mood and affect, CN 2-12 grossly intact, sensory grossly within normal limit, Strength 5/5 in all extremities, DTR 1+ x 4  Cap refill: Brisk, less than 3 seconds  Pulses: 2+, symmetric in all extremities  Skin: Warm, dry, without rashes or lesions    Data Review: All diagnostic labs and studies have been reviewed.     Abnormal Labs Reviewed   CBC WITH AUTOMATED DIFF - Abnormal; Notable for the following components:       Result Value    PLATELET 92 (*)     NEUTROPHILS 78 (*)     IMMATURE GRANULOCYTES 1 (*)     All other components within normal limits   METABOLIC PANEL, COMPREHENSIVE - Abnormal; Notable for the following components:    Glucose 123 (*)     BUN 21 (*)     Creatinine 1.39 (*)     GFR est AA 58 (*)     GFR est non-AA 48 (*)     Bilirubin, total 1.3 (*)     All other components within normal limits   NT-PRO BNP - Abnormal; Notable for the following components:    NT pro-BNP 1,639 (*)     All other components within normal limits   PROTHROMBIN TIME + INR - Abnormal; Notable for the following components:    INR 1.5 (*)     Prothrombin time 14.4 (*)     All other components within normal limits   PTT - Abnormal; Notable for the following components:    aPTT 36.2 (*)     All other components within normal limits       All Micro Results       None            IMAGING:   CTA CHEST W OR W WO CONT   Final Result   Addendum (preliminary) 1 of 1   Addendum: No pneumonia is identified. The opacity noted on the previous    chest   x-ray corresponds to the colon interposed between the liver and the    diaphragm. Final      1. No evidence of aortic dissection. 2.  Multiple pulmonary nodules. Follow-up chest CT is recommended in 6 months. 3.  Cardiomegaly and coronary atherosclerotic disease. 4.  Enlarged prostate         CTA ABDOMEN PELV W CONT   Final Result   Addendum (preliminary) 1 of 1   Addendum: No pneumonia is identified. The opacity noted on the previous    chest   x-ray corresponds to the colon interposed between the liver and the    diaphragm. Final      1. No evidence of aortic dissection. 2.  Multiple pulmonary nodules. Follow-up chest CT is recommended in 6 months. 3.  Cardiomegaly and coronary atherosclerotic disease. 4.  Enlarged prostate         XR CHEST PORT   Final Result   Ill-defined airspace opacity in the right lung base may represent   pneumonia.  Recommend follow-up imaging to document resolution           ECG/ECHO:    Results for orders placed or performed during the hospital encounter of 08/03/21   EKG, 12 LEAD, INITIAL   Result Value Ref Range    Ventricular Rate 38 BPM    Atrial Rate 357 BPM    QRS Duration 100 ms    Q-T Interval 508 ms    QTC Calculation (Bezet) 403 ms    Calculated R Axis -22 degrees    Calculated T Axis 55 degrees    Diagnosis       Atrial fibrillation with slow ventricular response  Abnormal ECG  No previous ECGs available  Confirmed by Lluvia Klein M.D., Rach Sparks ([id number]) on 8/4/2021 6:39:47 AM          Assessment: Given the patient's current clinical presentation, there is a high level of concern for decompensation if discharged from the emergency department. Complex decision making was performed, which includes reviewing the patient's available past medical records, laboratory results, and imaging studies. Chest pain  Atrial fibrillation on Xarelto  Bradycardia  JAIEDN on CKD  Plan:     Patient will be admitted on a telemetry bed, continue Xarelto, cycle troponins, nitroglycerin, Protonix, n.p.o. after midnight, stress test in the morning, monitor on telemetry, further intervention per hospital course  Patient bradycardic continue Xarelto, closely monitor for any signs or symptoms associated with bradycardia, discussed with cardiology if there is any need for pacemaker  Dental evaluation, no nephrotoxic medication currently we will medication, trend creatinine and continue to monitor      DIET: DIET NPO   ISOLATION PRECAUTIONS: There are currently no Active Isolations  CODE STATUS: Full Code   DVT PROPHYLAXIS: Xarelto  FUNCTIONAL STATUS PRIOR TO HOSPITALIZATION: Ambulatory and capable of self-care but relies on assistive devices (rolling walker/cane). EARLY MOBILITY ASSESSMENT: Recommend routine ambulation while hospitalized with the assistance of nursing staff  ANTICIPATED DISCHARGE: 24-48 hours. Signed By: Clay Irwin MD     September 1, 2022         Please note that this dictation may have been completed with Dragon, the computer voice recognition software. Quite often unanticipated grammatical, syntax, homophones, and other interpretive errors are inadvertently transcribed by the computer software. Please disregard these errors. Please excuse any errors that have escaped final proofreading.

## 2022-09-01 NOTE — PROGRESS NOTES
Patient admitted today. Transfer from [place] ED.  Patient compliant with current plan for diagnostic testing and consults with providers

## 2022-09-01 NOTE — ED NOTES
No further vomiting or complaints of pain. Pt ambulatory to bathroom. Dr. Rosey Petty in to update patient and discuss plan of care.

## 2022-09-01 NOTE — ED PROVIDER NOTES
70-year-old male with a past medical history significant for atrial fibrillation on anticoagulation with Xarelto who presents to the ER with a complaint of intermittent episodes of right-sided chest pain that radiated to his back and shoulder and down to his epigastric area accompanied by intermittent episodes of nausea and vomiting for a week. He described her daughter with discomfort, severity 8 out of 10, without any aggravation relieving factors. The patient denies any fever chills, sore throat, cough or congestion, headache, chest pain or shortness of exertion, diarrhea, constipation, dysuria, dizziness, extremity weakness or numbness, sick contact, skin rash or recent travel, prior history of the same. The patient has had 3 episodes of the symptoms and each time the next one more severe than the last one. Past Medical History:   Diagnosis Date    Atrial fibrillation [facility])        Past Surgical History:   Procedure Laterality Date    HX CHOLECYSTECTOMY      HX TONSIL AND ADENOIDECTOMY           History reviewed. No pertinent family history.     Social History     Socioeconomic History    Marital status:      Spouse name: Not on file    Number of children: Not on file    Years of education: Not on file    Highest education level: Not on file   Occupational History    Not on file   Tobacco Use    Smoking status: Never    Smokeless tobacco: Never   Vaping Use    Vaping Use: Never used   Substance and Sexual Activity    Alcohol use: Never    Drug use: Never    Sexual activity: Not on file   Other Topics Concern    Not on file   Social History Narrative    Not on file     Social Determinants of Health     Financial Resource Strain: Not on file   Food Insecurity: Not on file   Transportation Needs: Not on file   Physical Activity: Not on file   Stress: Not on file   Social Connections: Not on file   Intimate Partner Violence: Not on file   Housing Stability: Not on file         ALLERGIES: Patient has no known allergies. Review of Systems   All other systems reviewed and are negative. Vitals:    09/01/22 0344   Pulse: 68   Resp: 20   Temp: 98.8 °F (37.1 °C)   SpO2: 95%            Physical Exam  Vitals and nursing note reviewed. CONSTITUTIONAL: Elderly male in mild distress  HEAD: Normocephalic; atraumatic  EYES: PERRL; EOM intact; conjunctiva and sclera are clear bilaterally. ENT: No rhinorrhea; normal pharynx with no tonsillar hypertrophy; mucous membranes pink/moist, no erythema, no exudate. NECK: Supple; non-tender; no cervical lymphadenopathy  CARD: Normal S1, S2; no murmurs, rubs, or gallops. Regular rate and rhythm. RESP: Normal respiratory effort; breath sounds clear and equal bilaterally; no wheezes, rhonchi, or rales. ABD: Normal bowel sounds; non-distended; non-tender; no palpable organomegaly, no masses, no bruits. Back Exam: Normal inspection; no vertebral point tenderness, no CVA tenderness. Normal range of motion. EXT: Normal ROM in all four extremities; non-tender to palpation; no swelling or deformity; distal pulses are normal, no edema. SKIN: Warm; dry; no rash. NEURO:Alert and oriented x 3, coherent, BRETT-XII grossly intact, sensory and motor are non-focal.        MDM  Number of Diagnoses or Management Options  Acute chest pain  Diagnosis management comments: Assessment: 77-year-old male who presents to the ER with midsternal, right-sided chest and epigastric discomfort that radiated into his back and spine accompanied by intermittent episodes of nausea and diaphoresis. Differential diagnosis include ACS, VTE, aortic aneurysm/dissection, pleurisy and myofascial strain. Plan: Lab/IV fluid/antiemetic and analgesia/EKG/chest x-ray/CTA of the chest, abdomen and pelvis/blood pressure management/education, reassurance, symptomatic treatment/ Monitor and Reevaluate.          Amount and/or Complexity of Data Reviewed  Clinical lab tests: ordered and reviewed  Tests in the radiology section of CPT®: ordered and reviewed  Tests in the medicine section of CPT®: reviewed and ordered  Discussion of test results with the performing providers: yes  Decide to obtain previous medical records or to obtain history from someone other than the patient: yes  Obtain history from someone other than the patient: yes  Review and summarize past medical records: yes  Discuss the patient with other providers: yes  Independent visualization of images, tracings, or specimens: yes    Risk of Complications, Morbidity, and/or Mortality  Presenting problems: moderate  Diagnostic procedures: moderate  Management options: moderate    Patient Progress  Patient progress: stable         Procedures    ED EKG interpretation:  Rhythm: atrial fib; and regular with slow ventricular response. rate (approx.): 55; Axis: normal; QRS interval: prolonged; ST/T wave: non-specific changes; in  Lead: Diffusely; Other findings: abnormal ekg. This EKG was interpreted by Shayy Pritchett MD,ED Provider. 03:55 AM.    Yane Casey INTERPRETATION (ED MD)  Chest Xray  No acute process seen. Normal heart size. No bony abnormalities. No infiltrate. Rojas Parikh MD 4:00 AM     Repeat ED EKG interpretation:  Rhythm: atrial fib; and regular . Rate (approx.): 64; Axis: normal; QRS interval: prolonged; ST/T wave: non-specific changes; in  Lead: Diffusely; LAFB; Other findings: abnormal ekg. This EKG was interpreted by Shayy Pritchett MD,ED Provider. 06:17 AM    PROGRESS NOTE:  Pt has been reexamined by Rojas Parikh MD all available results have been reviewed with pt and any available family. Pt understands sx, dx, and tx in ED. Care plan has been outlined and questions have been answered. Pt and any available family understands and agrees to need for admission to hospital for further tx not available in ED. Pt is ready for admission.     Written by Shayy Pritchett MD,  6:37 AM    [facility] Consult for Admission  6:39 AM    ED Room Number: SER03/03  Patient Name and age:  Rebecca Lee 80 y.o.  male  Working Diagnosis:   1. Acute chest pain    2. Chronic atrial fibrillation ([address]        COVID-19 Suspicion:  no  Sepsis present:  no  Reassessment needed: no  Code Status:  Full Code  Readmission: no  Isolation Requirements:  no  Recommended Level of Care:  telemetry  Department: [place] ED - [phone]  Admitting Provider: Dr. Mert Fuentes    Other:  The patient is on chronic anticoagulation with Xarelto. Total critical care time spent exclusive of procedures:  65 minutes. Brennen Muñiz

## 2022-09-02 ENCOUNTER — APPOINTMENT (OUTPATIENT)
Dept: NON INVASIVE DIAGNOSTICS | Age: 87
DRG: 392 | End: 2022-09-02
Attending: INTERNAL MEDICINE
Payer: MEDICARE

## 2022-09-02 ENCOUNTER — APPOINTMENT (OUTPATIENT)
Dept: NUCLEAR MEDICINE | Age: 87
DRG: 392 | End: 2022-09-02
Attending: INTERNAL MEDICINE
Payer: MEDICARE

## 2022-09-02 VITALS
HEART RATE: 79 BPM | HEIGHT: 69 IN | BODY MASS INDEX: 26.48 KG/M2 | RESPIRATION RATE: 21 BRPM | WEIGHT: 178.8 LBS | SYSTOLIC BLOOD PRESSURE: 181 MMHG | OXYGEN SATURATION: 93 % | DIASTOLIC BLOOD PRESSURE: 61 MMHG | TEMPERATURE: 97.2 F

## 2022-09-02 LAB
ANION GAP SERPL CALC-SCNC: 5 MMOL/L (ref 5–15)
BASOPHILS # BLD: 0 K/UL (ref 0–0.1)
BASOPHILS NFR BLD: 0 % (ref 0–1)
BUN SERPL-MCNC: 19 MG/DL (ref 6–20)
BUN/CREAT SERPL: 17 (ref 12–20)
CALCIUM SERPL-MCNC: 8.2 MG/DL (ref 8.5–10.1)
CHLORIDE SERPL-SCNC: 109 MMOL/L (ref 97–108)
CO2 SERPL-SCNC: 24 MMOL/L (ref 21–32)
CREAT SERPL-MCNC: 1.1 MG/DL (ref 0.7–1.3)
DIFFERENTIAL METHOD BLD: ABNORMAL
EOSINOPHIL # BLD: 0.2 K/UL (ref 0–0.4)
EOSINOPHIL NFR BLD: 4 % (ref 0–7)
ERYTHROCYTE [DISTWIDTH] IN BLOOD BY AUTOMATED COUNT: 13.4 % (ref 11.5–14.5)
GLUCOSE SERPL-MCNC: 84 MG/DL (ref 65–100)
HCT VFR BLD AUTO: 38.5 % (ref 36.6–50.3)
HGB BLD-MCNC: 13.3 G/DL (ref 12.1–17)
IMM GRANULOCYTES # BLD AUTO: 0 K/UL (ref 0–0.04)
IMM GRANULOCYTES NFR BLD AUTO: 0 % (ref 0–0.5)
LYMPHOCYTES # BLD: 1 K/UL (ref 0.8–3.5)
LYMPHOCYTES NFR BLD: 24 % (ref 12–49)
MCH RBC QN AUTO: 33.3 PG (ref 26–34)
MCHC RBC AUTO-ENTMCNC: 34.5 G/DL (ref 30–36.5)
MCV RBC AUTO: 96.5 FL (ref 80–99)
MONOCYTES # BLD: 0.4 K/UL (ref 0–1)
MONOCYTES NFR BLD: 9 % (ref 5–13)
NEUTS SEG # BLD: 2.5 K/UL (ref 1.8–8)
NEUTS SEG NFR BLD: 63 % (ref 32–75)
NRBC # BLD: 0 K/UL (ref 0–0.01)
NRBC BLD-RTO: 0 PER 100 WBC
PLATELET # BLD AUTO: 71 K/UL (ref 150–400)
PMV BLD AUTO: 12.5 FL (ref 8.9–12.9)
POTASSIUM SERPL-SCNC: 4.1 MMOL/L (ref 3.5–5.1)
RBC # BLD AUTO: 3.99 M/UL (ref 4.1–5.7)
RBC MORPH BLD: ABNORMAL
SODIUM SERPL-SCNC: 138 MMOL/L (ref 136–145)
STRESS BASELINE DIAS BP: 95 MMHG
STRESS BASELINE HR: 47 BPM
STRESS BASELINE ST DEPRESSION: 0 MM
STRESS BASELINE SYS BP: 149 MMHG
STRESS ESTIMATED WORKLOAD: 1 METS
STRESS EXERCISE DUR MIN: 3 MIN
STRESS EXERCISE DUR SEC: 0 SEC
STRESS PEAK DIAS BP: 89 MMHG
STRESS PEAK SYS BP: 170 MMHG
STRESS PERCENT HR ACHIEVED: 55 %
STRESS POST PEAK HR: 72 BPM
STRESS RATE PRESSURE PRODUCT: NORMAL BPM*MMHG
STRESS ST DEPRESSION: 0 MM
STRESS STAGE 1 BP: NORMAL MMHG
STRESS STAGE 1 HR: 57 BPM
STRESS STAGE RECOVERY 1 BP: NORMAL MMHG
STRESS STAGE RECOVERY 1 HR: 68 BPM
STRESS TARGET HR: 131 BPM
WBC # BLD AUTO: 4.1 K/UL (ref 4.1–11.1)

## 2022-09-02 PROCEDURE — 74011000250 HC RX REV CODE- 250: Performed by: FAMILY MEDICINE

## 2022-09-02 PROCEDURE — 74011000250 HC RX REV CODE- 250: Performed by: INTERNAL MEDICINE

## 2022-09-02 PROCEDURE — A9500 TC99M SESTAMIBI: HCPCS

## 2022-09-02 PROCEDURE — 80048 BASIC METABOLIC PNL TOTAL CA: CPT

## 2022-09-02 PROCEDURE — 85025 COMPLETE CBC W/AUTO DIFF WBC: CPT

## 2022-09-02 PROCEDURE — 78452 HT MUSCLE IMAGE SPECT MULT: CPT

## 2022-09-02 PROCEDURE — 74011250636 HC RX REV CODE- 250/636: Performed by: FAMILY MEDICINE

## 2022-09-02 PROCEDURE — 74011250636 HC RX REV CODE- 250/636: Performed by: INTERNAL MEDICINE

## 2022-09-02 PROCEDURE — 74011250637 HC RX REV CODE- 250/637: Performed by: FAMILY MEDICINE

## 2022-09-02 PROCEDURE — C9113 INJ PANTOPRAZOLE SODIUM, VIA: HCPCS | Performed by: FAMILY MEDICINE

## 2022-09-02 PROCEDURE — 36415 COLL VENOUS BLD VENIPUNCTURE: CPT

## 2022-09-02 RX ORDER — TETRAKIS(2-METHOXYISOBUTYLISOCYANIDE)COPPER(I) TETRAFLUOROBORATE 1 MG/ML
31.1 INJECTION, POWDER, LYOPHILIZED, FOR SOLUTION INTRAVENOUS
Status: COMPLETED | OUTPATIENT
Start: 2022-09-02 | End: 2022-09-02

## 2022-09-02 RX ORDER — TETRAKIS(2-METHOXYISOBUTYLISOCYANIDE)COPPER(I) TETRAFLUOROBORATE 1 MG/ML
10.6 INJECTION, POWDER, LYOPHILIZED, FOR SOLUTION INTRAVENOUS
Status: COMPLETED | OUTPATIENT
Start: 2022-09-02 | End: 2022-09-02

## 2022-09-02 RX ORDER — SODIUM CHLORIDE 0.9 % (FLUSH) 0.9 %
20 SYRINGE (ML) INJECTION
Status: COMPLETED | OUTPATIENT
Start: 2022-09-02 | End: 2022-09-02

## 2022-09-02 RX ORDER — PANTOPRAZOLE SODIUM 40 MG/1
40 TABLET, DELAYED RELEASE ORAL DAILY
Qty: 30 TABLET | Refills: 0 | Status: SHIPPED | OUTPATIENT
Start: 2022-09-02

## 2022-09-02 RX ADMIN — ASPIRIN 81 MG CHEWABLE TABLET 81 MG: 81 TABLET CHEWABLE at 08:37

## 2022-09-02 RX ADMIN — Medication 20 ML: at 10:09

## 2022-09-02 RX ADMIN — PANTOPRAZOLE SODIUM 40 MG: 40 INJECTION, POWDER, FOR SOLUTION INTRAVENOUS at 08:37

## 2022-09-02 RX ADMIN — REGADENOSON 0.4 MG: 0.08 INJECTION, SOLUTION INTRAVENOUS at 10:09

## 2022-09-02 RX ADMIN — TETRAKIS(2-METHOXYISOBUTYLISOCYANIDE)COPPER(I) TETRAFLUOROBORATE 31.1 MILLICURIE: 1 INJECTION, POWDER, LYOPHILIZED, FOR SOLUTION INTRAVENOUS at 10:13

## 2022-09-02 RX ADMIN — TETRAKIS(2-METHOXYISOBUTYLISOCYANIDE)COPPER(I) TETRAFLUOROBORATE 10.6 MILLICURIE: 1 INJECTION, POWDER, LYOPHILIZED, FOR SOLUTION INTRAVENOUS at 07:40

## 2022-09-02 RX ADMIN — RIVAROXABAN 15 MG: 15 TABLET, FILM COATED ORAL at 08:37

## 2022-09-02 RX ADMIN — SODIUM CHLORIDE 50 ML/HR: 9 INJECTION, SOLUTION INTRAVENOUS at 06:59

## 2022-09-02 NOTE — PROGRESS NOTES
098:27 Spoke with Jignesh Phillips in [facility] to give morning medications after reviewing  Barrington Barron RN

## 2022-09-02 NOTE — PROGRESS NOTES
[facility] Cardiology Progress Note    Date of service: 9/2/2022    Subjective:   No further chest pain    Objective:    Visit Vitals  BP (!) 140/70 (BP 1 Location: Right upper arm, BP Patient Position: At rest)   Pulse (!) 48   Temp 97.9 °F (36.6 °C)   Resp 16   Ht 5' 9\" (1.753 m)   Wt 81.1 kg (178 lb 12.8 oz)   SpO2 98%   BMI 26.40 kg/m²       Physical Exam  Constitutional:       Appearance: He is well-developed. He is obese. HENT:      Head: Normocephalic and atraumatic. Eyes:      General: No scleral icterus. Conjunctiva/sclera: Conjunctivae normal.   Neck:      Vascular: No JVD. Cardiovascular:      Rate and Rhythm: Normal rate and regular rhythm. Heart sounds: Normal heart sounds. No murmur heard. No gallop. Pulmonary:      Effort: Pulmonary effort is normal. No respiratory distress. Breath sounds: Normal breath sounds. No stridor. No wheezing or rales. Abdominal:      General: There is no distension. Palpations: Abdomen is soft. Musculoskeletal:         General: No deformity. Skin:     General: Skin is warm and dry. Neurological:      Mental Status: He is alert and oriented to person, place, and time.    Psychiatric:         Behavior: Behavior normal.       Data reviewed:  Recent Results (from the past 12 hour(s))   METABOLIC PANEL, BASIC    Collection Time: 09/02/22  4:18 AM   Result Value Ref Range    Sodium 138 136 - 145 mmol/L    Potassium 4.1 3.5 - 5.1 mmol/L    Chloride 109 (H) 97 - 108 mmol/L    CO2 24 21 - 32 mmol/L    Anion gap 5 5 - 15 mmol/L    Glucose 84 65 - 100 mg/dL    BUN 19 6 - 20 MG/DL    Creatinine 1.10 0.70 - 1.30 MG/DL    BUN/Creatinine ratio 17 12 - 20      GFR est AA >60 >60 ml/min/1.73m2    GFR est non-AA >60 >60 ml/min/1.73m2    Calcium 8.2 (L) 8.5 - 10.1 MG/DL   CBC WITH AUTOMATED DIFF    Collection Time: 09/02/22  4:18 AM   Result Value Ref Range    WBC 4.1 4.1 - 11.1 K/uL    RBC 3.99 (L) 4.10 - 5.70 M/uL    HGB 13.3 12.1 - 17.0 g/dL    HCT 38.5 36.6 - 50.3 %    MCV 96.5 80.0 - 99.0 FL    MCH 33.3 26.0 - 34.0 PG    MCHC 34.5 30.0 - 36.5 g/dL    RDW 13.4 11.5 - 14.5 %    PLATELET 71 (L) 654 - 400 K/uL    MPV 12.5 8.9 - 12.9 FL    NRBC 0.0 0  WBC    ABSOLUTE NRBC 0.00 0.00 - 0.01 K/uL    NEUTROPHILS 63 32 - 75 %    LYMPHOCYTES 24 12 - 49 %    MONOCYTES 9 5 - 13 %    EOSINOPHILS 4 0 - 7 %    BASOPHILS 0 0 - 1 %    IMMATURE GRANULOCYTES 0 0.0 - 0.5 %    ABS. NEUTROPHILS 2.5 1.8 - 8.0 K/UL    ABS. LYMPHOCYTES 1.0 0.8 - 3.5 K/UL    ABS. MONOCYTES 0.4 0.0 - 1.0 K/UL    ABS. EOSINOPHILS 0.2 0.0 - 0.4 K/UL    ABS. BASOPHILS 0.0 0.0 - 0.1 K/UL    ABS. IMM. GRANS. 0.0 0.00 - 0.04 K/UL    DF SMEAR SCANNED      RBC COMMENTS MACROCYTOSIS  1+           Assessment and Plan:    Atypical chest pain  -Stress test today  -NPO  -If stress negative, can be discharged home. Permanent a.fib, slow rate (asymptomatic)  -Continue Xarelmarisela    FU with Dr Amanda Jacobs as OP    Signed:  Benson Valle.  Katelin Rucker MD  Interventional Cardiology  9/2/2022

## 2022-09-02 NOTE — PROGRESS NOTES
Physician Progress Note      Shantell Rodriguez  [facility] #:                  [id number]  :                       1932  ADMIT DATE:       2022 3:33 AM  DISCH DATE:  RESPONDING  PROVIDER #:        Anneliese Ogden MD        QUERY TEXT:    Stage of Chronic Kidney Disease: Please provide further specificity, if known. Clinical indicators include:  Options provided:  -- Chronic kidney disease stage 1  -- Chronic kidney disease stage 2  -- Chronic kidney disease stage 3  -- Chronic kidney disease stage 3a  -- Chronic kidney disease stage 3b  -- Chronic kidney disease stage 4  -- Chronic kidney disease stage 5  -- Chronic kidney disease stage 5, requiring dialysis  -- End stage renal disease  -- Other - I will add my own diagnosis  -- Disagree - Not applicable / Not valid  -- Disagree - Clinically Unable to determine / Unknown        PROVIDER RESPONSE TEXT:    Provider dismissed this query because it was not applicable to the patient or not a valid query. No CKD          QUERY TEXT:    Pt admitted with chest pain. Pt noted to have permanent A. Fib and Cardiology mention chest pain could be GI related. If possible, please document in progress notes and discharge summary if you are evaluating and/or treating any of the following: The medical record reflects the following:  Risk Factors: 89yo with A. Fib    Clinical Indicators:  Cardiology Consult  CP; atypical; it is more related to GI  Afib; permanent with slow HR; asymptomatic; a pacemaker is not indicated at this time    Stress test  Resting ECG: ECG is normal. The ECG shows atrial fibrillation. Resting ECG shows no ST-segment deviation. Stress ECG: Arrhythmias during stress: atrial fibrillation. No ST deviation was noted. Stress Test: A pharmacological stress test was performed using lexiscan. The patient exercised for 3 min and 0 sec.  Blood pressure demonstrated a normal response and heart rate demonstrated a normal response to stress. The patient's heart rate recovery was normal. The patient reported no symptoms during the stress test. The patient reached the end of the protocol. Treatment: Cardiology following, stress test, Protonix, Xarelto, ASA    Thank you,  [facility]  [phone]  I am also available via [facility]. Options provided:  -- Chest pain due to GERD  -- Chest pain due to permanent A. Fib  -- Other - I will add my own diagnosis  -- Disagree - Not applicable / Not valid  -- Disagree - Clinically unable to determine / Unknown  -- Refer to Clinical Documentation Reviewer    PROVIDER RESPONSE TEXT:    This patient has chest pain due to GERD.     Query created by: Kvng Flores on 9/2/2022 11:20 AM      Electronically signed by:  Kwame Maguire MD 9/2/2022 2:25 PM

## 2022-09-02 NOTE — PROGRESS NOTES
JOSE ROBERTO:  RUR: 8%    Disposition:   Home pending  nuclear test results    Chart reviewed. Patient admitted here on 9/1/22 from home with complaints of right-sided chest pain,  abdominal pain and nausea for several days. The patient has a past medical hx of atrial fib. Care Management Interventions  PCP Verified by CM: Yes  Mode of Transport at Discharge: Other (see comment)  Transition of Care Consult (CM Consult): Discharge Planning  Discharge Durable Medical Equipment: No  Physical Therapy Consult: No  Occupational Therapy Consult: No  Speech Therapy Consult: No  Support Systems: Spouse/Significant Other  Confirm Follow Up Transport: Family  The Patient and/or Patient Representative was Provided with a Choice of Provider and Agrees with the Discharge Plan?: Yes  Discharge Location  Patient Expects to be Discharged to[de-identified] Home     Medicare pt has received, reviewed, and signed 2nd IM letter informing them of their right to appeal the discharge. Signed copied has been placed on pt bedside chart. CM met with the patient and his wife Sharif Richard (at bedside). Demographics verified. The patient is alert and oriented and independent in ADL's and IADL's.      Pharmacy:  [facility]    Reason for Admission:  see above                     RUR Score:      8%             8%  Plan for utilizing home health:      not indicated    PCP: First and Last name:  Amy Terry MD     Name of Practice: same as above   Are you a current patient: Yes/No:    Approximate date of last visit:    Can you participate in a virtual visit with your PCP: yes                    Current Advanced Directive/Advance Care Plan: Full Code      Healthcare Decision Maker:   Click here to complete [address] including selection of the Healthcare Decision Maker Relationship (ie \"Primary\") wife Shy                             Transition of Care Plan:     home pending stress test results       There are no CM needs for this patient at this time.     Mega Wright, [place], [address]

## 2022-09-02 NOTE — PROGRESS NOTES
Patient being d/c home    17:13 patient IV and Tele removed from patient;  17:14 Dr. Melanie Hu on unit to discharge patient  Prescription sent to [facility]; Wife at bed side to take patient home; Patient is to follow up with PCP.      Shalini Merritt

## 2022-09-02 NOTE — DISCHARGE SUMMARY
Discharge Summary       PATIENT ID: Modesto Schmidt  MRN: [id number]   YOB: 1932    DATE OF ADMISSION: 9/1/2022  3:33 AM    DATE OF DISCHARGE: 9/2/2022   PRIMARY CARE PROVIDER: None     ATTENDING PHYSICIAN: Tavia Grajeda  DISCHARGING PROVIDER: Danna New MD    To contact this individual call [phone] and ask the  to page. If unavailable ask to be transferred the Adult Hospitalist Department. CONSULTATIONS: IP CONSULT TO HOSPITALIST  IP CONSULT TO CARDIOLOGY    PROCEDURES/SURGERIES: * No surgery found *    DISCHARGE DIAGNOSES:     Chest pain  Atrial fibrillation    ADMISSION SUMMARY AND HOSPITAL COURSE:     HPI  Modesto Schmidt is a 80 y.o. male who presents with chest pain. History was primarily obtained from the patient, patient reports that he has history of A. fib, is on Xarelto, started having pain on the right side of his chest, pain does not radiate to the back, but radiates to the right shoulder blade, patient reports that his chest pain gets better after passing gas or burping, patient got concerned, she [facility], and was sent to the [facility] for further management and evaluation, currently patient denies any chest pain, reports that he has been compliant with his medications. Hospital Course  Patient presents with chest pain and admitted for further evaluation management. Patient was evaluated by cardiology and went for nuclear stress test.  That shows no definite evidence of ischemia and/or infarction. Left ventricular ejection fraction equals 64%. Left ventricular wall motion and thickening is normal.  Patient's chest pain has resolved. This is likely atypical, probable GI related. Patient was started on Protonix on discharge. Outpatient follow-up with cardiology as scheduled. DISCHARGE DIAGNOSES / PLAN:        BMI: Body mass index is 26.4 kg/m². . This patient: Meets criteria for overweight given BMI >/= 25 and < 30 due to excess calories/nutritional. Weight loss and lifestyle modifications should be encouraged as an outpatient. PENDING TEST RESULTS:   At the time of discharge the following test results are still pending: None     ADDITIONAL CARE RECOMMENDATIONS:     Follow-up with cardiology as scheduled. NOTIFY YOUR PHYSICIAN FOR ANY OF THE FOLLOWING:   Fever over 101 degrees for 24 hours. Chest pain, shortness of breath, fever, chills, nausea, vomiting, diarrhea, change in mentation, falling, weakness, bleeding. Severe pain or pain not relieved by medications, as well as any other signs or symptoms that you may have questions about. FOLLOW UP APPOINTMENTS:    Follow-up Information       Follow up With Specialties Details Why Contact Info    None    None ([phone]) Patient stated that they have no PCP      Bennie Simmons MD Internal Medicine Physician   [facility]  [address]  [phone]                 DIET: Cardiac Diet    ACTIVITY: Activity as tolerated    EQUIPMENT needed: None    DISCHARGE MEDICATIONS:  Current Discharge Medication List        START taking these medications    Details   pantoprazole (Protonix) 40 mg tablet Take 1 Tablet by mouth daily. Qty: 30 Tablet, Refills: 0  Start date: 9/2/2022           CONTINUE these medications which have NOT CHANGED    Details   rivaroxaban (Xarelto) 10 mg tablet Take 10 mg by mouth daily. DISPOSITION:   * Home With:   OT  PT  HH  RN       Long term SNF/Inpatient Rehab    Independent/assisted living    Hospice    Other:       PATIENT CONDITION AT DISCHARGE:     Functional status    Poor     Deconditioned    * Independent      Cognition    * Lucid     Forgetful     Dementia      Catheters/lines (plus indication)    [facility]     PICC     PEG    * None      Code status   *  Full code     DNR      PHYSICAL EXAMINATION AT DISCHARGE:  General:          Alert, cooperative, no distress, appears stated age.      HEENT:           Atraumatic, anicteric sclerae, pink conjunctivae                          No oral ulcers, mucosa moist, throat clear, dentition fair  Neck:               Supple, symmetrical  Lungs:             Clear to auscultation bilaterally. No Wheezing or Rhonchi. No rales. Chest wall:      No tenderness  No Accessory muscle use. Heart:              Regular  rhythm,  No  murmur   No edema  Abdomen:        Soft, non-tender. Not distended. Bowel sounds normal  Extremities:     No cyanosis. No clubbing,                            Skin turgor normal, Capillary refill normal  Skin:                Not pale. Not Jaundiced  No rashes   Psych:             Not anxious or agitated.   Neurologic:      Alert, moves all extremities, answers questions appropriately and responds to commands       [address]:  Problem List as of 9/2/2022 Date Reviewed: 8/4/2021            Codes Class Noted - Resolved    Chest pain ICD-10-CM: R07.9  ICD-9-CM: 786.50  9/1/2022 - Present        Atrial fibrillation ([facility] [address] ICD-10-CM: I48.91  ICD-9-CM: 427.31  8/4/2021 - Present        Bradycardia ICD-10-CM: R00.1  ICD-9-CM: 427.89  8/3/2021 - Present           Greater than 60 minutes were spent with the patient on counseling and coordination of care    Signed:   Anabella Hardy MD  9/2/2022  4:58 PM

## 2022-09-02 NOTE — PROGRESS NOTES
0800 Bedside shift change report given to Anali Pruitt  (oncoming nurse) by Kenny Morales (offgoing nurse).  Report included the following information SBAR, Kardex, ED Summary, , Procedure Summary, Intake/Output, MAR, Accordion, Recent Results, Med Rec Status, and Cardiac Rhythm  A-Fib

## 2022-09-03 ENCOUNTER — HOSPITAL ENCOUNTER (EMERGENCY)
Age: 87
Discharge: HOME | End: 2022-09-03
Attending: STUDENT IN AN ORGANIZED HEALTH CARE EDUCATION/TRAINING PROGRAM
Payer: MEDICARE

## 2022-09-03 VITALS
BODY MASS INDEX: 26.13 KG/M2 | OXYGEN SATURATION: 95 % | HEIGHT: 70 IN | WEIGHT: 182.54 LBS | RESPIRATION RATE: 20 BRPM | TEMPERATURE: 98.3 F | HEART RATE: 57 BPM | DIASTOLIC BLOOD PRESSURE: 97 MMHG | SYSTOLIC BLOOD PRESSURE: 210 MMHG

## 2022-09-03 DIAGNOSIS — M62.830 SPASM OF THORACIC BACK MUSCLE: Primary | ICD-10-CM

## 2022-09-03 DIAGNOSIS — I15.9 SECONDARY HYPERTENSION: ICD-10-CM

## 2022-09-03 LAB
ALBUMIN SERPL-MCNC: 4 G/DL (ref 3.5–5)
ALBUMIN/GLOB SERPL: 1.3 {RATIO} (ref 1.1–2.2)
ALP SERPL-CCNC: 81 U/L (ref 45–117)
ALT SERPL-CCNC: 23 U/L (ref 12–78)
ANION GAP SERPL CALC-SCNC: 12 MMOL/L (ref 5–15)
AST SERPL-CCNC: 24 U/L (ref 15–37)
BASOPHILS # BLD: 0 K/UL (ref 0–0.1)
BASOPHILS NFR BLD: 0 % (ref 0–1)
BILIRUB SERPL-MCNC: 1.7 MG/DL (ref 0.2–1)
BUN SERPL-MCNC: 17 MG/DL (ref 6–20)
BUN/CREAT SERPL: 14 (ref 12–20)
CALCIUM SERPL-MCNC: 9 MG/DL (ref 8.5–10.1)
CHLORIDE SERPL-SCNC: 99 MMOL/L (ref 97–108)
CO2 SERPL-SCNC: 26 MMOL/L (ref 21–32)
CREAT SERPL-MCNC: 1.25 MG/DL (ref 0.7–1.3)
DIFFERENTIAL METHOD BLD: ABNORMAL
EOSINOPHIL # BLD: 0.1 K/UL (ref 0–0.4)
EOSINOPHIL NFR BLD: 1 % (ref 0–7)
ERYTHROCYTE [DISTWIDTH] IN BLOOD BY AUTOMATED COUNT: 13.3 % (ref 11.5–14.5)
GLOBULIN SER CALC-MCNC: 3.2 G/DL (ref 2–4)
GLUCOSE SERPL-MCNC: 105 MG/DL (ref 65–100)
HCT VFR BLD AUTO: 44.9 % (ref 36.6–50.3)
HGB BLD-MCNC: 15.4 G/DL (ref 12.1–17)
IMM GRANULOCYTES # BLD AUTO: 0 K/UL
IMM GRANULOCYTES NFR BLD AUTO: 0 %
LIPASE SERPL-CCNC: 41 U/L (ref 73–393)
LYMPHOCYTES # BLD: 0.7 K/UL (ref 0.8–3.5)
LYMPHOCYTES NFR BLD: 10 % (ref 12–49)
MCH RBC QN AUTO: 32.9 PG (ref 26–34)
MCHC RBC AUTO-ENTMCNC: 34.3 G/DL (ref 30–36.5)
MCV RBC AUTO: 95.9 FL (ref 80–99)
MONOCYTES # BLD: 0.5 K/UL (ref 0–1)
MONOCYTES NFR BLD: 7 % (ref 5–13)
NEUTS SEG # BLD: 6 K/UL (ref 1.8–8)
NEUTS SEG NFR BLD: 82 % (ref 32–75)
NRBC # BLD: 0 K/UL (ref 0–0.01)
NRBC BLD-RTO: 0 PER 100 WBC
PLATELET # BLD AUTO: 87 K/UL (ref 150–400)
PMV BLD AUTO: 12.5 FL (ref 8.9–12.9)
POTASSIUM SERPL-SCNC: 4.1 MMOL/L (ref 3.5–5.1)
PROT SERPL-MCNC: 7.2 G/DL (ref 6.4–8.2)
RBC # BLD AUTO: 4.68 M/UL (ref 4.1–5.7)
RBC MORPH BLD: ABNORMAL
SODIUM SERPL-SCNC: 137 MMOL/L (ref 136–145)
TROPONIN-HIGH SENSITIVITY: 20 NG/L (ref 0–76)
WBC # BLD AUTO: 7.3 K/UL (ref 4.1–11.1)

## 2022-09-03 PROCEDURE — 84484 ASSAY OF TROPONIN QUANT: CPT

## 2022-09-03 PROCEDURE — 74011000250 HC RX REV CODE- 250: Performed by: STUDENT IN AN ORGANIZED HEALTH CARE EDUCATION/TRAINING PROGRAM

## 2022-09-03 PROCEDURE — 96375 TX/PRO/DX INJ NEW DRUG ADDON: CPT

## 2022-09-03 PROCEDURE — 85025 COMPLETE CBC W/AUTO DIFF WBC: CPT

## 2022-09-03 PROCEDURE — 74011250637 HC RX REV CODE- 250/637: Performed by: STUDENT IN AN ORGANIZED HEALTH CARE EDUCATION/TRAINING PROGRAM

## 2022-09-03 PROCEDURE — 96374 THER/PROPH/DIAG INJ IV PUSH: CPT

## 2022-09-03 PROCEDURE — 83690 ASSAY OF LIPASE: CPT

## 2022-09-03 PROCEDURE — 74011250636 HC RX REV CODE- 250/636: Performed by: STUDENT IN AN ORGANIZED HEALTH CARE EDUCATION/TRAINING PROGRAM

## 2022-09-03 PROCEDURE — 80053 COMPREHEN METABOLIC PANEL: CPT

## 2022-09-03 PROCEDURE — 36415 COLL VENOUS BLD VENIPUNCTURE: CPT

## 2022-09-03 PROCEDURE — 99284 EMERGENCY DEPT VISIT MOD MDM: CPT

## 2022-09-03 RX ORDER — CYCLOBENZAPRINE HCL 10 MG
10 TABLET ORAL
Qty: 12 TABLET | Refills: 0 | Status: SHIPPED | OUTPATIENT
Start: 2022-09-03

## 2022-09-03 RX ORDER — MORPHINE SULFATE 4 MG/ML
4 INJECTION INTRAVENOUS
Status: DISCONTINUED | OUTPATIENT
Start: 2022-09-03 | End: 2022-09-03

## 2022-09-03 RX ORDER — ONDANSETRON 2 MG/ML
4 INJECTION INTRAMUSCULAR; INTRAVENOUS
Status: COMPLETED | OUTPATIENT
Start: 2022-09-03 | End: 2022-09-03

## 2022-09-03 RX ORDER — ONDANSETRON 2 MG/ML
4 INJECTION INTRAMUSCULAR; INTRAVENOUS
Status: DISCONTINUED | OUTPATIENT
Start: 2022-09-03 | End: 2022-09-03

## 2022-09-03 RX ORDER — ONDANSETRON 4 MG/1
4 TABLET, ORALLY DISINTEGRATING ORAL
Qty: 12 TABLET | Refills: 0 | Status: SHIPPED | OUTPATIENT
Start: 2022-09-03 | End: 2022-09-03

## 2022-09-03 RX ORDER — ONDANSETRON 4 MG/1
4 TABLET, ORALLY DISINTEGRATING ORAL
Qty: 12 TABLET | Refills: 0 | Status: SHIPPED | OUTPATIENT
Start: 2022-09-03

## 2022-09-03 RX ORDER — MORPHINE SULFATE 2 MG/ML
2 INJECTION, SOLUTION INTRAMUSCULAR; INTRAVENOUS
Status: COMPLETED | OUTPATIENT
Start: 2022-09-03 | End: 2022-09-03

## 2022-09-03 RX ORDER — CYCLOBENZAPRINE HCL 10 MG
10 TABLET ORAL
Status: COMPLETED | OUTPATIENT
Start: 2022-09-03 | End: 2022-09-03

## 2022-09-03 RX ADMIN — ONDANSETRON 4 MG: 2 INJECTION INTRAMUSCULAR; INTRAVENOUS at 19:10

## 2022-09-03 RX ADMIN — CYCLOBENZAPRINE 10 MG: 10 TABLET, FILM COATED ORAL at 19:06

## 2022-09-03 RX ADMIN — MORPHINE SULFATE 2 MG: 2 INJECTION, SOLUTION INTRAMUSCULAR; INTRAVENOUS at 18:44

## 2022-09-03 RX ADMIN — FAMOTIDINE 20 MG: 10 INJECTION, SOLUTION INTRAVENOUS at 18:44

## 2022-09-03 NOTE — ED TRIAGE NOTES
Patient ambulatory from [address] [place] [address] with complaints of fall down stairs on Sunday. Patient complains of right rib pain and generalized weakness. Patient reports striking head on moulding patient is on xerelto. Pain to Right Flank with palpation. Patient states there is no blood in urine.

## 2022-09-05 NOTE — ED PROVIDER NOTES
Date: 9/3/2022  Patient Name: August Villegas    History of Presenting Illness     Chief Complaint   Patient presents with    Fall    Rib Pain    Flank Pain       History Provided By: Patient    HPI: August Villegas, 80 y.o. male  presents to the ED with cc of right flank pain. He was seen a few days ago and had similar symptoms after a fall that occurred last week. He states that he felt well after he was discharged but then suddenly experienced pain on the right side of his thorax, worse with certain movements and reproducible palpation, sharp. Pain was so severe he developed vomiting. He denies fevers, chills. Did not have a headache. He is anticoagulated on Xarelto. There are no other complaints, changes, or physical findings at this time. PCP: None    No current facility-administered medications on file prior to encounter. Current Outpatient Medications on File Prior to Encounter   Medication Sig Dispense Refill    pantoprazole (Protonix) 40 mg tablet Take 1 Tablet by mouth daily. 30 Tablet 0    rivaroxaban (Xarelto) 10 mg tablet Take 10 mg by mouth daily. Past History     Past Medical History:  Past Medical History:   Diagnosis Date    Atrial fibrillation ([address]        Past Surgical History:  Past Surgical History:   Procedure Laterality Date    HX CHOLECYSTECTOMY      HX TONSIL AND ADENOIDECTOMY         Family History:  History reviewed. No pertinent family history. Social History:  Social History     Tobacco Use    Smoking status: Never    Smokeless tobacco: Never   Vaping Use    Vaping Use: Never used   Substance Use Topics    Alcohol use: Never    Drug use: Never       Allergies:  No Known Allergies      Review of Systems   Review of Systems   Constitutional:  Negative for chills, fatigue and fever. HENT:  Negative for ear pain, sore throat and trouble swallowing. Eyes:  Negative for visual disturbance. Respiratory:  Negative for cough and shortness of breath. Cardiovascular:  Positive for chest pain. Gastrointestinal:  Negative for abdominal pain. Genitourinary:  Positive for flank pain. Negative for dysuria. Musculoskeletal:  Negative for back pain. Skin:  Negative for rash. Neurological:  Negative for light-headedness and headaches. Psychiatric/Behavioral:  Negative for confusion. All other systems reviewed and are negative. Physical Exam   Physical Exam  Vitals reviewed. Constitutional:       General: He is not in acute distress. Appearance: He is not toxic-appearing. HENT:      Head: Normocephalic. Eyes:      Pupils: Pupils are equal, round, and reactive to light. Neck:      Vascular: No JVD. Cardiovascular:      Rate and Rhythm: Normal rate and regular rhythm. Pulmonary:      Effort: Pulmonary effort is normal. No respiratory distress. Breath sounds: Normal breath sounds. Abdominal:      Palpations: Abdomen is soft. Musculoskeletal:      Cervical back: Normal range of motion and neck supple. Back:       Right lower leg: No edema. Left lower leg: No edema. Skin:     General: Skin is warm and dry. Capillary Refill: Capillary refill takes less than 2 seconds. Neurological:      General: No focal deficit present. Mental Status: He is alert and oriented to person, place, and time. Motor: No weakness.    Psychiatric:         Mood and Affect: Mood normal.       Diagnostic Study Results     Labs -  Recent Results (from the past 72 hour(s))   METABOLIC PANEL, BASIC    Collection Time: 09/02/22  4:18 AM   Result Value Ref Range    Sodium 138 136 - 145 mmol/L    Potassium 4.1 3.5 - 5.1 mmol/L    Chloride 109 (H) 97 - 108 mmol/L    CO2 24 21 - 32 mmol/L    Anion gap 5 5 - 15 mmol/L    Glucose 84 65 - 100 mg/dL    BUN 19 6 - 20 MG/DL    Creatinine 1.10 0.70 - 1.30 MG/DL    BUN/Creatinine ratio 17 12 - 20      GFR est AA >60 >60 ml/min/1.73m2    GFR est non-AA >60 >60 ml/min/1.73m2    Calcium 8.2 (L) 8.5 - 10.1 MG/DL   CBC WITH AUTOMATED DIFF    Collection Time: 09/02/22  4:18 AM   Result Value Ref Range    WBC 4.1 4.1 - 11.1 K/uL    RBC 3.99 (L) 4.10 - 5.70 M/uL    HGB 13.3 12.1 - 17.0 g/dL    HCT 38.5 36.6 - 50.3 %    MCV 96.5 80.0 - 99.0 FL    MCH 33.3 26.0 - 34.0 PG    MCHC 34.5 30.0 - 36.5 g/dL    RDW 13.4 11.5 - 14.5 %    PLATELET 71 (L) 295 - 400 K/uL    MPV 12.5 8.9 - 12.9 FL    NRBC 0.0 0  WBC    ABSOLUTE NRBC 0.00 0.00 - 0.01 K/uL    NEUTROPHILS 63 32 - 75 %    LYMPHOCYTES 24 12 - 49 %    MONOCYTES 9 5 - 13 %    EOSINOPHILS 4 0 - 7 %    BASOPHILS 0 0 - 1 %    IMMATURE GRANULOCYTES 0 0.0 - 0.5 %    ABS. NEUTROPHILS 2.5 1.8 - 8.0 K/UL    ABS. LYMPHOCYTES 1.0 0.8 - 3.5 K/UL    ABS. MONOCYTES 0.4 0.0 - 1.0 K/UL    ABS. EOSINOPHILS 0.2 0.0 - 0.4 K/UL    ABS. BASOPHILS 0.0 0.0 - 0.1 K/UL    ABS. IMM.  GRANS. 0.0 0.00 - 0.04 K/UL    DF SMEAR SCANNED      RBC COMMENTS MACROCYTOSIS  1+       NUCLEAR CARDIAC STRESS TEST    Collection Time: 09/02/22 11:29 AM   Result Value Ref Range    Stress Target  bpm    Exercise Duration Time 3 min    Exercuse Duration Seconds 0 sec    Stress Systolic  mmHg    Stress Diastolic BP 89 mmHg    Stress Peak HR 72 BPM    Baseline HR 47 BPM    Stress Estimated Workload 1.0 METS    Stress Rate Pressure Product 12,240 BPM*mmHg    Stress Percent HR Achieved 55 %    Baseline Systolic  mmHg    Baseline Diastolic BP 95 mmHg    Stress Stage 1 HR 57 bpm    Stress Stage 1 /80 mmHg    Recovery Stage 1 HR 68 bpm    Recovery Stage 1 /89 mmHg    Baseline ST Depression 0 mm    Stress ST Depression 0 mm   METABOLIC PANEL, COMPREHENSIVE    Collection Time: 09/03/22  6:45 PM   Result Value Ref Range    Sodium 137 136 - 145 mmol/L    Potassium 4.1 3.5 - 5.1 mmol/L    Chloride 99 97 - 108 mmol/L    CO2 26 21 - 32 mmol/L    Anion gap 12 5 - 15 mmol/L    Glucose 105 (H) 65 - 100 mg/dL    BUN 17 6 - 20 MG/DL    Creatinine 1.25 0.70 - 1.30 MG/DL    BUN/Creatinine ratio 14 12 - 20      GFR est AA >60 >60 ml/min/1.73m2    GFR est non-AA 54 (L) >60 ml/min/1.73m2    Calcium 9.0 8.5 - 10.1 MG/DL    Bilirubin, total 1.7 (H) 0.2 - 1.0 MG/DL    ALT (SGPT) 23 12 - 78 U/L    AST (SGOT) 24 15 - 37 U/L    Alk. phosphatase 81 45 - 117 U/L    Protein, total 7.2 6.4 - 8.2 g/dL    Albumin 4.0 3.5 - 5.0 g/dL    Globulin 3.2 2.0 - 4.0 g/dL    A-G Ratio 1.3 1.1 - 2.2     CBC WITH AUTOMATED DIFF    Collection Time: 09/03/22  6:45 PM   Result Value Ref Range    WBC 7.3 4.1 - 11.1 K/uL    RBC 4.68 4.10 - 5.70 M/uL    HGB 15.4 12.1 - 17.0 g/dL    HCT 44.9 36.6 - 50.3 %    MCV 95.9 80.0 - 99.0 FL    MCH 32.9 26.0 - 34.0 PG    MCHC 34.3 30.0 - 36.5 g/dL    RDW 13.3 11.5 - 14.5 %    PLATELET 87 (L) 746 - 400 K/uL    MPV 12.5 8.9 - 12.9 FL    NRBC 0.0 0  WBC    ABSOLUTE NRBC 0.00 0.00 - 0.01 K/uL    NEUTROPHILS 82 (H) 32 - 75 %    LYMPHOCYTES 10 (L) 12 - 49 %    MONOCYTES 7 5 - 13 %    EOSINOPHILS 1 0 - 7 %    BASOPHILS 0 0 - 1 %    IMMATURE GRANULOCYTES 0 %    ABS. NEUTROPHILS 6.0 1.8 - 8.0 K/UL    ABS. LYMPHOCYTES 0.7 (L) 0.8 - 3.5 K/UL    ABS. MONOCYTES 0.5 0.0 - 1.0 K/UL    ABS. EOSINOPHILS 0.1 0.0 - 0.4 K/UL    ABS. BASOPHILS 0.0 0.0 - 0.1 K/UL    ABS. IMM. GRANS. 0.0 K/UL    DF SMEAR SCANNED      RBC COMMENTS NORMOCYTIC, NORMOCHROMIC     LIPASE    Collection Time: 09/03/22  6:45 PM   Result Value Ref Range    Lipase 41 (L) 73 - 393 U/L   TROPONIN-HIGH SENSITIVITY    Collection Time: 09/03/22  7:17 PM   Result Value Ref Range    Troponin-High Sensitivity 20 0 - 76 ng/L       Radiologic Studies -   No orders to display     CT Results  (Last 48 hours)      None          CXR Results  (Last 48 hours)      None            Medical Decision Making   I am the first provider for this patient. I reviewed the vital signs, available nursing notes, past medical history, past surgical history, family history and social history. Vital Signs-Reviewed the patient's vital signs. No data found.       Records Reviewed: Nursing Notes and Old Medical Records    Provider Notes (Medical Decision Making):   Patient had significant relief with the medication provided, his blood pressure remained significantly elevated, however this may be related to increased pain, he received hydralazine on his last admission but will not need to be admitted at this time given that he is improved, I also reviewed the results from his recent CT of the chest abdomen pelvis which did not reveal any fracture and he has not had a reinjury, I also reviewed these images with the patient himself. After reassurance that he did not have a rib fracture patient states that the anxiety relief also provided some improvement in the pain. May have intercostal or paraspinous muscle spasm which is contributing, given a muscle relaxant, did not appear to have any significant sedation from this medicine in the ED. ED Course:   Initial assessment performed. The patients presenting problems have been discussed, and they are in agreement with the care plan formulated and outlined with them. I have encouraged them to ask questions as they arise throughout their visit. Disposition:      The patient's results have been reviewed with His. He has been counseled regarding her diagnosis. He verbally conveys understanding and agreement of the signs, symptoms, diagnosis, treatment, and prognosis and additionally agrees to follow up as recommended with Dr. Cervantes in 24-48 hours. He also agrees with the care-plan and conveys that all of His questions have been answered. I have also put together some discharge instructions for His that include: 1) educational information regarding their diagnosis, 2) how to care for their diagnosis at home, as well a 3) list of reasons why they would want to return to the ED prior to their follow-up appointment, should their condition change. DISCHARGE PLAN:  1.    Discharge Medication List as of 9/3/2022  7:44 PM START taking these medications    Details   cyclobenzaprine (FLEXERIL) 10 mg tablet Take 1 Tablet by mouth two (2) times daily as needed for Muscle Spasm(s). , Normal, Disp-12 Tablet, R-0           CONTINUE these medications which have CHANGED    Details   ondansetron (ZOFRAN ODT) 4 mg disintegrating tablet Take 1 Tablet by mouth every eight (8) hours as needed for Nausea., Normal, Disp-12 Tablet, R-0           CONTINUE these medications which have NOT CHANGED    Details   pantoprazole (Protonix) 40 mg tablet Take 1 Tablet by mouth daily. , Normal, Disp-30 Tablet, R-0      rivaroxaban (Xarelto) 10 mg tablet Take 10 mg by mouth daily. , Historical Med           2. Follow-up Information       Follow up With Specialties Details Why Contact Info    [facility] Emergency Medicine  As needed Esther Taylorjason [phone]          3. Return to ED if worse     Diagnosis     Clinical Impression:   1. Spasm of thoracic back muscle    2. Secondary hypertension        Attestations:    Arash Padilla MD    Please note that this dictation was completed with [email or web address], the [employer] voice recognition software. Quite often unanticipated grammatical, syntax, homophones, and other interpretive errors are inadvertently transcribed by the computer software. Please disregard these errors. Please excuse any errors that have escaped final proofreading. Thank you.

## 2022-09-08 ENCOUNTER — APPOINTMENT (OUTPATIENT)
Dept: CT IMAGING | Age: 87
End: 2022-09-08
Attending: EMERGENCY MEDICINE
Payer: MEDICARE

## 2022-09-08 ENCOUNTER — HOSPITAL ENCOUNTER (EMERGENCY)
Age: 87
Discharge: HOME | End: 2022-09-08
Attending: EMERGENCY MEDICINE
Payer: MEDICARE

## 2022-09-08 ENCOUNTER — TELEPHONE (OUTPATIENT)
Dept: PRIMARY CARE CLINIC | Age: 87
End: 2022-09-08

## 2022-09-08 ENCOUNTER — NURSE TRIAGE (OUTPATIENT)
Dept: OTHER | Facility: CLINIC | Age: 87
End: 2022-09-08

## 2022-09-08 VITALS
TEMPERATURE: 97.9 F | OXYGEN SATURATION: 96 % | RESPIRATION RATE: 18 BRPM | DIASTOLIC BLOOD PRESSURE: 87 MMHG | HEART RATE: 66 BPM | WEIGHT: 180.78 LBS | BODY MASS INDEX: 25.94 KG/M2 | SYSTOLIC BLOOD PRESSURE: 166 MMHG

## 2022-09-08 DIAGNOSIS — K59.00 CONSTIPATION, UNSPECIFIED CONSTIPATION TYPE: Primary | ICD-10-CM

## 2022-09-08 LAB
ALBUMIN SERPL-MCNC: 3.2 G/DL (ref 3.5–5)
ALBUMIN/GLOB SERPL: 0.9 {RATIO} (ref 1.1–2.2)
ALP SERPL-CCNC: 79 U/L (ref 45–117)
ALT SERPL-CCNC: 12 U/L (ref 12–78)
ANION GAP SERPL CALC-SCNC: 7 MMOL/L (ref 5–15)
APPEARANCE UR: CLEAR
AST SERPL-CCNC: 15 U/L (ref 15–37)
BACTERIA URNS QL MICRO: NEGATIVE /HPF
BASOPHILS # BLD: 0 K/UL (ref 0–0.1)
BASOPHILS NFR BLD: 0 % (ref 0–1)
BILIRUB SERPL-MCNC: 1.2 MG/DL (ref 0.2–1)
BILIRUB UR QL: NEGATIVE
BUN SERPL-MCNC: 12 MG/DL (ref 6–20)
BUN/CREAT SERPL: 10 (ref 12–20)
CALCIUM SERPL-MCNC: 8.6 MG/DL (ref 8.5–10.1)
CHLORIDE SERPL-SCNC: 95 MMOL/L (ref 97–108)
CO2 SERPL-SCNC: 29 MMOL/L (ref 21–32)
COLOR UR: ABNORMAL
CREAT SERPL-MCNC: 1.24 MG/DL (ref 0.7–1.3)
DIFFERENTIAL METHOD BLD: ABNORMAL
EOSINOPHIL # BLD: 0.1 K/UL (ref 0–0.4)
EOSINOPHIL NFR BLD: 2 % (ref 0–7)
EPITH CASTS URNS QL MICRO: ABNORMAL /LPF
ERYTHROCYTE [DISTWIDTH] IN BLOOD BY AUTOMATED COUNT: 12.9 % (ref 11.5–14.5)
GLOBULIN SER CALC-MCNC: 3.6 G/DL (ref 2–4)
GLUCOSE SERPL-MCNC: 140 MG/DL (ref 65–100)
GLUCOSE UR STRIP.AUTO-MCNC: NEGATIVE MG/DL
HCT VFR BLD AUTO: 42.9 % (ref 36.6–50.3)
HGB BLD-MCNC: 14.9 G/DL (ref 12.1–17)
HGB UR QL STRIP: ABNORMAL
IMM GRANULOCYTES # BLD AUTO: 0 K/UL (ref 0–0.04)
IMM GRANULOCYTES NFR BLD AUTO: 0 % (ref 0–0.5)
KETONES UR QL STRIP.AUTO: NEGATIVE MG/DL
LEUKOCYTE ESTERASE UR QL STRIP.AUTO: NEGATIVE
LYMPHOCYTES # BLD: 0.8 K/UL (ref 0.8–3.5)
LYMPHOCYTES NFR BLD: 11 % (ref 12–49)
MCH RBC QN AUTO: 32.7 PG (ref 26–34)
MCHC RBC AUTO-ENTMCNC: 34.7 G/DL (ref 30–36.5)
MCV RBC AUTO: 94.1 FL (ref 80–99)
MONOCYTES # BLD: 0.6 K/UL (ref 0–1)
MONOCYTES NFR BLD: 9 % (ref 5–13)
NEUTS SEG # BLD: 5.5 K/UL (ref 1.8–8)
NEUTS SEG NFR BLD: 78 % (ref 32–75)
NITRITE UR QL STRIP.AUTO: NEGATIVE
NRBC # BLD: 0 K/UL (ref 0–0.01)
NRBC BLD-RTO: 0 PER 100 WBC
PH UR STRIP: 7.5 [PH] (ref 5–8)
PLATELET # BLD AUTO: 111 K/UL (ref 150–400)
PMV BLD AUTO: 11.5 FL (ref 8.9–12.9)
POTASSIUM SERPL-SCNC: 4.3 MMOL/L (ref 3.5–5.1)
PROT SERPL-MCNC: 6.8 G/DL (ref 6.4–8.2)
PROT UR STRIP-MCNC: NEGATIVE MG/DL
RBC # BLD AUTO: 4.56 M/UL (ref 4.1–5.7)
RBC #/AREA URNS HPF: ABNORMAL /HPF (ref 0–5)
RBC MORPH BLD: ABNORMAL
SODIUM SERPL-SCNC: 131 MMOL/L (ref 136–145)
SP GR UR REFRACTOMETRY: 1.01 (ref 1–1.03)
UR CULT HOLD, URHOLD: NORMAL
UROBILINOGEN UR QL STRIP.AUTO: 0.2 EU/DL (ref 0.2–1)
WBC # BLD AUTO: 7 K/UL (ref 4.1–11.1)
WBC URNS QL MICRO: ABNORMAL /HPF (ref 0–4)

## 2022-09-08 PROCEDURE — 85025 COMPLETE CBC W/AUTO DIFF WBC: CPT

## 2022-09-08 PROCEDURE — 99284 EMERGENCY DEPT VISIT MOD MDM: CPT

## 2022-09-08 PROCEDURE — 74011250637 HC RX REV CODE- 250/637: Performed by: EMERGENCY MEDICINE

## 2022-09-08 PROCEDURE — 81001 URINALYSIS AUTO W/SCOPE: CPT

## 2022-09-08 PROCEDURE — 36415 COLL VENOUS BLD VENIPUNCTURE: CPT

## 2022-09-08 PROCEDURE — 74176 CT ABD & PELVIS W/O CONTRAST: CPT

## 2022-09-08 PROCEDURE — 80053 COMPREHEN METABOLIC PANEL: CPT

## 2022-09-08 RX ORDER — SIMETHICONE 80 MG
80 TABLET,CHEWABLE ORAL
Status: COMPLETED | OUTPATIENT
Start: 2022-09-08 | End: 2022-09-08

## 2022-09-08 RX ORDER — POLYETHYLENE GLYCOL 3350 17 G/17G
17 POWDER, FOR SOLUTION ORAL DAILY
Qty: 235 G | Refills: 0 | Status: SHIPPED | OUTPATIENT
Start: 2022-09-08

## 2022-09-08 RX ADMIN — Medication 80 MG: at 17:28

## 2022-09-08 NOTE — TELEPHONE ENCOUNTER
Received call from Adriana Fuentes at [facility] with [employer]. Subjective: Caller states \"Abd pain\"     Current Symptoms: Abd pain, \"starts from esophagus to the small intestines\"  nausea. Yesterday felt bloated. Been feeling cold since yesterday. Onset: 4 days ago; worsening    Associated Symptoms: NA    Pain Severity: 5/10; pain; constant    Temperature: 98.8 oral    What has been tried: None    LMP: NA Pregnant: NA    Recommended disposition: See in Office Today    Care advice provided, patient verbalizes understanding; denies any other questions or concerns; instructed to call back for any new or worsening symptoms. Patient/Caller agrees with recommended disposition; writer provided warm transfer to [facility] at [facility] for appointment scheduling    Attention Provider: Thank you for allowing me to participate in the care of your patient. The patient was connected to triage in response to information provided to the ECC. Please do not respond through this encounter as the response is not directed to a shared pool.       Reason for Disposition   MODERATE pain (e.g., interferes with normal activities) that comes and goes (cramps) lasts > 24 hours  (Exception: pain with Vomiting or Diarrhea - see that Protocol)    Protocols used: Abdominal Pain - Male-ADULT-[place]

## 2022-09-08 NOTE — ED NOTES
Patient is standing in the HEART [facility], asking to be D.C.  Unable to get last set of VS.

## 2022-09-08 NOTE — TELEPHONE ENCOUNTER
Patient nurse triage call from [facility]. Patient is have abdominal pain. Please call patient to discuss next steps of care. Would like to be seen by Dr. Bela Hutchison.

## 2022-09-11 NOTE — ED PROVIDER NOTES
80-year-old male with past medical history significant for atrial fibrillation presents with complaints of 3 to 4 days intermittent sharp abdominal pain associated with gas and constipation. Denies urinary complaints. Denies nausea, vomiting, fever, chills. Denies tobacco use, drug use, alcohol use  [facility]       Past Medical History:   Diagnosis Date    Atrial fibrillation ([facility] [address]        Past Surgical History:   Procedure Laterality Date    HX CHOLECYSTECTOMY      HX TONSIL AND ADENOIDECTOMY           No family history on file. Social History     Socioeconomic History    Marital status:      Spouse name: Not on file    Number of children: Not on file    Years of education: Not on file    Highest education level: Not on file   Occupational History    Not on file   Tobacco Use    Smoking status: Never    Smokeless tobacco: Never   Vaping Use    Vaping Use: Never used   Substance and Sexual Activity    Alcohol use: Never    Drug use: Never    Sexual activity: Not on file   Other Topics Concern    Not on file   Social History Narrative    Not on file     Social Determinants of Health     Financial Resource Strain: Not on file   Food Insecurity: Not on file   Transportation Needs: Not on file   Physical Activity: Not on file   Stress: Not on file   Social Connections: Not on file   Intimate Partner Violence: Not on file   Housing Stability: Not on file         ALLERGIES: Patient has no known allergies. Review of Systems   Constitutional:  Negative for chills and fever. HENT:  Negative for congestion, nosebleeds and sore throat. Eyes:  Negative for pain and discharge. Respiratory:  Negative for cough and shortness of breath. Cardiovascular:  Negative for chest pain and palpitations. Gastrointestinal:  Positive for abdominal pain and constipation. Negative for nausea and vomiting. Genitourinary:  Negative for decreased urine volume, dysuria, flank pain and urgency.    Musculoskeletal: Negative for gait problem and myalgias. Skin:  Negative for rash and wound. Neurological:  Negative for seizures and syncope. Hematological:  Does not bruise/bleed easily. Psychiatric/Behavioral:  Negative for confusion, self-injury and suicidal ideas. Vitals:    09/08/22 1548 09/08/22 1550 09/08/22 1605 09/08/22 1718   BP:  (!) 144/68  (!) 166/87   Pulse: 66      Resp: 18      Temp: 97.9 °F (36.6 °C)      SpO2: 96%  96%    Weight:  82 kg (180 lb 12.4 oz)              Physical Exam  Vitals and nursing note reviewed. Constitutional:       Appearance: He is well-developed. HENT:      Head: Normocephalic and atraumatic. Eyes:      Pupils: Pupils are equal, round, and reactive to light. Cardiovascular:      Rate and Rhythm: Normal rate and regular rhythm. Heart sounds: Normal heart sounds. Pulmonary:      Effort: Pulmonary effort is normal. No respiratory distress. Breath sounds: Normal breath sounds. No wheezing. Abdominal:      General: Bowel sounds are normal.      Palpations: Abdomen is soft. Tenderness: There is no abdominal tenderness. There is no guarding or rebound. Musculoskeletal:         General: Normal range of motion. Cervical back: Normal range of motion and neck supple. Skin:     General: Skin is warm and dry. Neurological:      Mental Status: He is alert and oriented to person, place, and time. Psychiatric:         Behavior: Behavior normal.        MDM  Number of Diagnoses or Management Options  Constipation, unspecified constipation type  Diagnosis management comments: 80-year-old male presents with complaints of 3 to 4 days of sharp intermittent abdominal pain associate with constipation. Patient is well-appearing, no acute distress, hemodynamically stable, afebrile, nontoxic, abdomen soft/nontender/nondistended. Plan-UA, CBC/CMP, CT abdomen pelvis.     Labs unremarkable  CT shows constipation, diverticulosis, cholelithiasis, gastric cardia thickening       Amount and/or Complexity of Data Reviewed  Clinical lab tests: ordered and reviewed  Tests in the radiology section of CPT®: ordered and reviewed           Procedures      Discussed results with patient and family. Advised follow-up with primary care physician and possibly gastroenterology for an endoscopy. Patient expresses understanding. Patient reports much improvement after simethicone. 9:58 PM  Patient's results have been reviewed with them. Patient and/or family have verbally conveyed their understanding and agreement of the patient's signs, symptoms, diagnosis, treatment and prognosis and additionally agree to follow up as recommended or return to the Emergency Room should their condition change prior to follow-up. Discharge instructions have also been provided to the patient with some educational information regarding their diagnosis as well a list of reasons why they would want to return to the ER prior to their follow-up appointment should their condition change.

## 2022-09-23 ENCOUNTER — HOSPITAL ENCOUNTER (EMERGENCY)
Age: 87
Discharge: HOME | End: 2022-09-23
Attending: EMERGENCY MEDICINE | Admitting: EMERGENCY MEDICINE
Payer: MEDICARE

## 2022-09-23 ENCOUNTER — APPOINTMENT (OUTPATIENT)
Dept: GENERAL RADIOLOGY | Age: 87
End: 2022-09-23
Attending: EMERGENCY MEDICINE
Payer: MEDICARE

## 2022-09-23 VITALS
SYSTOLIC BLOOD PRESSURE: 155 MMHG | WEIGHT: 183.2 LBS | DIASTOLIC BLOOD PRESSURE: 76 MMHG | HEART RATE: 51 BPM | RESPIRATION RATE: 16 BRPM | BODY MASS INDEX: 26.23 KG/M2 | TEMPERATURE: 97.6 F | HEIGHT: 70 IN | OXYGEN SATURATION: 98 %

## 2022-09-23 DIAGNOSIS — S51.012A SKIN TEAR OF LEFT ELBOW WITHOUT COMPLICATION, INITIAL ENCOUNTER: Primary | ICD-10-CM

## 2022-09-23 DIAGNOSIS — S50.02XA TRAUMATIC HEMATOMA OF LEFT ELBOW, INITIAL ENCOUNTER: ICD-10-CM

## 2022-09-23 PROCEDURE — 99283 EMERGENCY DEPT VISIT LOW MDM: CPT | Performed by: EMERGENCY MEDICINE

## 2022-09-23 PROCEDURE — 73080 X-RAY EXAM OF ELBOW: CPT

## 2022-09-23 PROCEDURE — 74011000250 HC RX REV CODE- 250: Performed by: EMERGENCY MEDICINE

## 2022-09-23 PROCEDURE — 74011250637 HC RX REV CODE- 250/637: Performed by: EMERGENCY MEDICINE

## 2022-09-23 RX ORDER — BACITRACIN 500 UNIT/G
1 PACKET (EA) TOPICAL
Status: COMPLETED | OUTPATIENT
Start: 2022-09-23 | End: 2022-09-23

## 2022-09-23 RX ORDER — BACITRACIN 500 [USP'U]/G
OINTMENT TOPICAL 3 TIMES DAILY
Qty: 30 G | Refills: 0 | Status: SHIPPED | OUTPATIENT
Start: 2022-09-23 | End: 2022-10-03

## 2022-09-23 RX ORDER — ACETAMINOPHEN 500 MG
1000 TABLET ORAL
Status: COMPLETED | OUTPATIENT
Start: 2022-09-23 | End: 2022-09-23

## 2022-09-23 RX ADMIN — ACETAMINOPHEN 1000 MG: 500 TABLET, FILM COATED ORAL at 16:04

## 2022-09-23 RX ADMIN — Medication 1 PACKET: at 16:05

## 2022-09-23 NOTE — ED TRIAGE NOTES
Patient reports fall  forward and sideways yesterday after lost his balance. Patient reports pain to the left forearm and elbow, bruising and swelling noted. Patient has limited ROM. Patient takes blood thinner medications, hit his head, no LOC. Small laceration noted to the left eyebrow.

## 2022-09-25 NOTE — ED PROVIDER NOTES
The history is provided by the patient. Elbow Pain   This is a new problem. The current episode started yesterday. The problem occurs constantly. The problem has not changed since onset. The pain is present in the left elbow and left arm. The pain is mild. Pertinent negatives include full range of motion and no stiffness. The symptoms are aggravated by palpation. He has tried OTC ointments for the symptoms. There has been a history of trauma (fell onto left elbow yesterday). Past Medical History:   Diagnosis Date    Atrial fibrillation [facility])        Past Surgical History:   Procedure Laterality Date    HX CHOLECYSTECTOMY      HX TONSIL AND ADENOIDECTOMY           History reviewed. No pertinent family history. Social History     Socioeconomic History    Marital status:      Spouse name: Not on file    Number of children: Not on file    Years of education: Not on file    Highest education level: Not on file   Occupational History    Not on file   Tobacco Use    Smoking status: Never    Smokeless tobacco: Never   Vaping Use    Vaping Use: Never used   Substance and Sexual Activity    Alcohol use: Never    Drug use: Never    Sexual activity: Not on file   Other Topics Concern    Not on file   Social History Narrative    Not on file     Social Determinants of Health     Financial Resource Strain: Not on file   Food Insecurity: Not on file   Transportation Needs: Not on file   Physical Activity: Not on file   Stress: Not on file   Social Connections: Not on file   Intimate Partner Violence: Not on file   Housing Stability: Not on file         ALLERGIES: Patient has no known allergies. Review of Systems   Musculoskeletal:  Negative for stiffness. All other systems reviewed and are negative.     Vitals:    09/23/22 1458 09/23/22 1500 09/23/22 1515   BP: (!) 155/76 (!) 155/76    Pulse: (!) 51     Resp: 16     Temp: 97.6 °F (36.4 °C)     SpO2: 97% 97% 98%   Weight: 83.1 kg (183 lb 3.2 oz)     Height: 5' 10\" (1.778 m)              Physical Exam  Vitals and nursing note reviewed. Constitutional:       General: He is not in acute distress. Appearance: He is well-developed. HENT:      Head: Normocephalic and atraumatic. Eyes:      Conjunctiva/sclera: Conjunctivae normal.   Neck:      Trachea: No tracheal deviation. Cardiovascular:      Rate and Rhythm: Normal rate and regular rhythm. Pulmonary:      Effort: Pulmonary effort is normal. No respiratory distress. Abdominal:      General: There is no distension. Musculoskeletal:         General: No deformity. Normal range of motion. Cervical back: Neck supple. Comments: Left elbow swelling, palpable hematoma with overlying skin tearing 3 cm, hemostatic   Skin:     General: Skin is warm and dry. Neurological:      Mental Status: He is alert. Cranial Nerves: No cranial nerve deficit. Psychiatric:         Behavior: Behavior normal.        MDM       80 y.o. male presents with fall onto left elbow yesterday with hemostatic skin tearing. Recommended topical antibiotics and supportive care. No evidence of bony injury. Plan to follow up with PCP as needed and return precautions discussed for worsening or new concerning symptoms.      Procedures

## 2023-01-10 ENCOUNTER — OFFICE VISIT (OUTPATIENT)
Dept: PRIMARY CARE CLINIC | Age: 88
End: 2023-01-10
Payer: MEDICARE

## 2023-01-10 VITALS
WEIGHT: 181 LBS | RESPIRATION RATE: 17 BRPM | DIASTOLIC BLOOD PRESSURE: 76 MMHG | HEIGHT: 70 IN | TEMPERATURE: 97.1 F | OXYGEN SATURATION: 99 % | BODY MASS INDEX: 25.91 KG/M2 | HEART RATE: 52 BPM | SYSTOLIC BLOOD PRESSURE: 145 MMHG

## 2023-01-10 DIAGNOSIS — Z12.5 SCREENING FOR PROSTATE CANCER: ICD-10-CM

## 2023-01-10 DIAGNOSIS — I48.91 ATRIAL FIBRILLATION, UNSPECIFIED TYPE (HCC): Primary | ICD-10-CM

## 2023-01-10 DIAGNOSIS — M1A.9XX0 CHRONIC GOUT WITHOUT TOPHUS, UNSPECIFIED CAUSE, UNSPECIFIED SITE: ICD-10-CM

## 2023-01-10 DIAGNOSIS — E87.1 HYPONATREMIA: ICD-10-CM

## 2023-01-10 DIAGNOSIS — N18.31 CHRONIC KIDNEY DISEASE, STAGE 3A (HCC): ICD-10-CM

## 2023-01-10 PROCEDURE — G8427 DOCREV CUR MEDS BY ELIG CLIN: HCPCS | Performed by: FAMILY MEDICINE

## 2023-01-10 PROCEDURE — G8417 CALC BMI ABV UP PARAM F/U: HCPCS | Performed by: FAMILY MEDICINE

## 2023-01-10 PROCEDURE — G8536 NO DOC ELDER MAL SCRN: HCPCS | Performed by: FAMILY MEDICINE

## 2023-01-10 PROCEDURE — 1101F PT FALLS ASSESS-DOCD LE1/YR: CPT | Performed by: FAMILY MEDICINE

## 2023-01-10 PROCEDURE — 99204 OFFICE O/P NEW MOD 45 MIN: CPT | Performed by: FAMILY MEDICINE

## 2023-01-10 PROCEDURE — 1123F ACP DISCUSS/DSCN MKR DOCD: CPT | Performed by: FAMILY MEDICINE

## 2023-01-10 PROCEDURE — G8510 SCR DEP NEG, NO PLAN REQD: HCPCS | Performed by: FAMILY MEDICINE

## 2023-01-10 RX ORDER — METHYLPREDNISOLONE 4 MG/1
TABLET ORAL
Qty: 1 DOSE PACK | Refills: 0 | Status: SHIPPED | OUTPATIENT
Start: 2023-01-10

## 2023-01-10 NOTE — PROGRESS NOTES
Chief Complaint   Patient presents with    Establish Care        Identified pt with two pt identifiers(name and ). Chief Complaint   Patient presents with    Establish Care        3 most recent hospitals 36 Screens 1/10/2023   Little interest or pleasure in doing things Not at all   Feeling down, depressed, irritable, or hopeless Not at all   Total Score PHQ 2 0        There were no vitals filed for this visit. Health Maintenance Due   Topic Date Due    COVID-19 Vaccine (1) Never done    DTaP/Tdap/Td series (1 - Tdap) Never done    Shingles Vaccine (1 of 2) Never done    Pneumococcal 65+ years (1 - PCV) Never done    Medicare Yearly Exam  Never done    Flu Vaccine (1) 2022    Depression Screen  2022        1. Have you been to the ER, urgent care clinic since your last visit? Hospitalized since your last visit? No    2. Have you seen or consulted any other health care providers outside of the [address] [place] [address] since your last visit? Include any pap smears or colon screening. No    3. For patients aged 39-70: Has the patient had a colonoscopy / FIT/ Cologuard? NA - based on age      If the patient is female:    4. For patients aged 41-77: Has the patient had a mammogram within the past 2 years? NA - based on age or sex      11. For patients aged 21-65: Has the patient had a pap smear?  NA - based on age or sex

## 2023-01-10 NOTE — PROGRESS NOTES
HPI     Chief Complaint   Patient presents with    [facility] Care     He is a 80 y.o. male who presents for [facility] care. A Fib - Has been on Xarelto since November 2018. Was hospitalized at this time and started Eliquis and then switched to Xarelto later. Feels his BP has been going up since he started on the Xarelto. States his BP was 110/50-60s prior to being on the blood thinner. No chest, pain, headaches. He is followed by Cardiology. Denies any bleeding. He is UTD on colonoscopies and has another scope scheduled at the end of this month. He does have a hx of blood clots about 3 years ago. Gout - He has a hx of gout. First episode in 2001. Combats this by staying well hydrated. Has about 4 episodes a year. Has been taking Indomethacin in the past for gout flares. He does not drink, smoke. He does not eat shellfish. Does not eat much red meat, no pork. Followed by Dr. Guero Ortega for thrombocytopenia who does regular CBC/ blood work. Has appt with Derm 1/23/23 to look at multiple skin lesions. 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 States he has always been relatively healthy. He currently still works as a . Goes to court multiple times a week. Moved 2 years ago from [facility]. Lives with his wife of 62 years. Review of Systems   Constitutional:  Negative for unexpected weight change. Gastrointestinal:  Negative for blood in stool. Hematological:  Does not bruise/bleed easily. Reviewed PmHx, RxHx, FmHx, SocHx, AllgHx and updated and dated in the chart. Physical Exam:  Visit Vitals  BP (!) 145/76   Pulse (!) 52   Temp 97.1 °F (36.2 °C) (Temporal)   Resp 17   Ht 5' 10\" (1.778 m)   Wt 181 lb (82.1 kg)   SpO2 99%   BMI 25.97 kg/m²     Physical Exam  Vitals and nursing note reviewed. Constitutional:       General: He is not in acute distress. Appearance: He is not ill-appearing.    Cardiovascular:      Rate and Rhythm: Regular rhythm. Bradycardia present. Heart sounds: No murmur heard. Pulmonary:      Effort: Pulmonary effort is normal. No respiratory distress. Breath sounds: Normal breath sounds. No wheezing, rhonchi or rales. Neurological:      General: No focal deficit present. Mental Status: He is alert. Psychiatric:         Mood and Affect: Mood normal.         Behavior: Behavior normal.     No results found for this or any previous visit (from the past 12 hour(s)). Assessment / Plan     Diagnoses and all orders for this visit:    1. [facility]rial fibrillation, unspecified type ([facility]    2. Chronic gout without tophus, unspecified cause, unspecified site  -     methylPREDNISolone (MEDROL DOSEPACK) 4 mg tablet; Take as needed for gout flare.  -     URIC ACID; Future  -     METABOLIC PANEL, BASIC; Future    3. Screening for prostate cancer  -     PSA SCREENING (SCREENING); Future    4. Chronic kidney disease, stage 3a ([facility]  -     METABOLIC PANEL, BASIC; Future    5. Hyponatremia  -     METABOLIC PANEL, BASIC; Future     Discussed I would not recommend taking Indomethacin or NSAIDs with his Xarelto. Given a Rx for medrol dose pack in case he has a flare shi since he travels out of town. Will check labs. Follow up with Heme, Derm, GI and Cards as scheduled. BP is slightly high today. Recommend he check his BP daily at home for next 1-2 weeks. If BP is persistently elevated may need small dose of BP med. Discussed to call back if BP persistently elevated. I have discussed the diagnosis with the patient and the intended plan as seen in the above orders. The patient has received an after-visit summary and questions were answered concerning future plans. I have discussed medication side effects and warnings with the patient as well.     Patience Kang,

## 2023-01-11 LAB
ANION GAP SERPL CALC-SCNC: 6 MMOL/L (ref 5–15)
BUN SERPL-MCNC: 14 MG/DL (ref 6–20)
BUN/CREAT SERPL: 12 (ref 12–20)
CALCIUM SERPL-MCNC: 9.5 MG/DL (ref 8.5–10.1)
CHLORIDE SERPL-SCNC: 101 MMOL/L (ref 97–108)
CO2 SERPL-SCNC: 30 MMOL/L (ref 21–32)
CREAT SERPL-MCNC: 1.14 MG/DL (ref 0.7–1.3)
GLUCOSE SERPL-MCNC: 89 MG/DL (ref 65–100)
POTASSIUM SERPL-SCNC: 4.3 MMOL/L (ref 3.5–5.1)
PSA SERPL-MCNC: 1.5 NG/ML (ref 0.01–4)
SODIUM SERPL-SCNC: 137 MMOL/L (ref 136–145)
URATE SERPL-MCNC: 7.5 MG/DL (ref 3.5–7.2)

## 2023-05-29 ENCOUNTER — HOSPITAL ENCOUNTER (EMERGENCY)
Facility: HOSPITAL | Age: 88
Discharge: HOME | End: 2023-05-29
Attending: EMERGENCY MEDICINE
Payer: MEDICARE

## 2023-05-29 ENCOUNTER — APPOINTMENT (OUTPATIENT)
Facility: HOSPITAL | Age: 88
End: 2023-05-29
Payer: MEDICARE

## 2023-05-29 VITALS
OXYGEN SATURATION: 97 % | RESPIRATION RATE: 16 BRPM | HEART RATE: 57 BPM | BODY MASS INDEX: 24.71 KG/M2 | SYSTOLIC BLOOD PRESSURE: 137 MMHG | HEIGHT: 70 IN | DIASTOLIC BLOOD PRESSURE: 57 MMHG | WEIGHT: 172.6 LBS | TEMPERATURE: 98.3 F

## 2023-05-29 DIAGNOSIS — L03.116 CELLULITIS OF LEFT LOWER EXTREMITY: Primary | ICD-10-CM

## 2023-05-29 DIAGNOSIS — M10.9 ACUTE GOUT OF LEFT FOOT, UNSPECIFIED CAUSE: ICD-10-CM

## 2023-05-29 LAB — ECHO BSA: 1.97 M2

## 2023-05-29 PROCEDURE — 93971 EXTREMITY STUDY: CPT

## 2023-05-29 PROCEDURE — 99284 EMERGENCY DEPT VISIT MOD MDM: CPT

## 2023-05-29 RX ORDER — INDOMETHACIN 50 MG/1
50 CAPSULE ORAL 2 TIMES DAILY WITH MEALS
Qty: 14 CAPSULE | Refills: 0 | Status: SHIPPED | OUTPATIENT
Start: 2023-05-29

## 2023-05-29 RX ORDER — CEPHALEXIN 500 MG/1
500 CAPSULE ORAL 2 TIMES DAILY
Qty: 14 CAPSULE | Refills: 0 | Status: SHIPPED | OUTPATIENT
Start: 2023-05-29 | End: 2023-06-05

## 2023-05-29 ASSESSMENT — PAIN SCALES - GENERAL: PAINLEVEL_OUTOF10: 2

## 2023-05-29 ASSESSMENT — LIFESTYLE VARIABLES: HOW OFTEN DO YOU HAVE A DRINK CONTAINING ALCOHOL: NEVER

## 2023-05-29 ASSESSMENT — PAIN DESCRIPTION - LOCATION: LOCATION: FOOT

## 2023-05-29 ASSESSMENT — PAIN DESCRIPTION - ORIENTATION: ORIENTATION: LOWER;LEFT

## 2023-05-29 ASSESSMENT — PAIN DESCRIPTION - PAIN TYPE: TYPE: ACUTE PAIN

## 2023-05-29 ASSESSMENT — PAIN - FUNCTIONAL ASSESSMENT: PAIN_FUNCTIONAL_ASSESSMENT: 0-10

## 2023-05-29 NOTE — ED PROVIDER NOTES
[facility]  EMERGENCY DEPARTMENT ENCOUNTER      Pt Name: Brian Rees  MRN: [id number]  Willgfrafaela 12/22/1932  Date of evaluation: 5/29/2023  Provider: Corky Bey MD    [address] [place] [address]       Chief Complaint   Patient presents with    Foot Pain         HISTORY OF PRESENT ILLNESS   (Location/Symptom, Timing/Onset, Context/Setting, Quality, Duration, Modifying Factors, Severity)  Note limiting factors. 80-year-old male with PMHx of A-fib on Xarelto, gout, and DVT presents emergency department planing of left foot and ankle redness, swelling, and pain x2 to 3 days. Patient has no additional complaints at this time    The history is provided by the patient and the spouse. Review of External Medical Records:     Nursing Notes were reviewed. REVIEW OF SYSTEMS    (2-9 systems for level 4, 10 or more for level 5)     Review of Systems   Constitutional: Negative. HENT: Negative. Eyes: Negative. Respiratory: Negative. Cardiovascular: Negative. Gastrointestinal: Negative. Genitourinary: Negative. Musculoskeletal:  Positive for arthralgias and joint swelling. Skin: Negative. Neurological: Negative. Psychiatric/Behavioral: Negative. Except as noted above the remainder of the review of systems was reviewed and negative. PAST MEDICAL HISTORY     Past Medical History:   Diagnosis Date    Atrial fibrillation ([address]     DVT (deep venous thrombosis) ([facility])          SURGICAL HISTORY       Past Surgical History:   Procedure Laterality Date    CHOLECYSTECTOMY      TONSILLECTOMY AND ADENOIDECTOMY           CURRENT MEDICATIONS       Previous Medications    METHYLPREDNISOLONE (MEDROL DOSEPACK) 4 MG TABLET    Take as needed for gout flare. RIVAROXABAN (XARELTO) 10 MG TABS TABLET    Take 1 tablet by mouth daily       ALLERGIES     Patient has no known allergies. FAMILY HISTORY     History reviewed. No pertinent family history.        SOCIAL HISTORY       Social

## 2023-05-29 NOTE — DISCHARGE INSTRUCTIONS
You were seen in the emergency department for foot swelling and pain. The results of your tests were reassuring. Although an exact cause of your symptoms was not identified, the most likely cause is gout versus cellulitis. Please take your indomethacin as prescribed for 2 days and if no improvement, then stop and begin taking the antibiotic you were prescribed. Please also be aware that taking indomethacin with your Xarelto does increase your risk for bleeding so if you experience any GI bleeding, blood in your urine, or falls, please call your primary care doctor or return to the emergency department. Please follow-up with your PCP or return to the emergency department if you experience a worsening of symptoms or any new symptoms that are concerning to you.

## 2023-05-29 NOTE — ED TRIAGE NOTES
Pt reports pain noted to R foot x2 weeks. Pt reports heat and swelling noted for the past few days. Pt reports he was starting to have difficulty walking since Friday.

## 2023-05-29 NOTE — ED NOTES
Pain in toes for 2 weeks, 5/18 pain intensified while returning from travel.        Elder Swift RN  05/29/23 [id number]

## 2023-05-30 ASSESSMENT — ENCOUNTER SYMPTOMS
EYES NEGATIVE: 1
RESPIRATORY NEGATIVE: 1
GASTROINTESTINAL NEGATIVE: 1

## 2023-07-24 ENCOUNTER — ANESTHESIA EVENT (OUTPATIENT)
Facility: HOSPITAL | Age: 88
End: 2023-07-24
Payer: MEDICARE

## 2023-07-24 ENCOUNTER — ANESTHESIA (OUTPATIENT)
Facility: HOSPITAL | Age: 88
End: 2023-07-24
Payer: MEDICARE

## 2023-07-24 ENCOUNTER — HOSPITAL ENCOUNTER (OUTPATIENT)
Facility: HOSPITAL | Age: 88
Setting detail: OUTPATIENT SURGERY
Discharge: HOME | End: 2023-07-24
Attending: INTERNAL MEDICINE | Admitting: INTERNAL MEDICINE
Payer: MEDICARE

## 2023-07-24 VITALS
TEMPERATURE: 98 F | SYSTOLIC BLOOD PRESSURE: 143 MMHG | OXYGEN SATURATION: 100 % | WEIGHT: 169 LBS | RESPIRATION RATE: 15 BRPM | DIASTOLIC BLOOD PRESSURE: 98 MMHG | BODY MASS INDEX: 24.25 KG/M2 | HEART RATE: 53 BPM

## 2023-07-24 PROCEDURE — 6360000002 HC RX W HCPCS: Performed by: ANESTHESIOLOGY

## 2023-07-24 PROCEDURE — 7100000010 HC PHASE II RECOVERY - FIRST 15 MIN: Performed by: INTERNAL MEDICINE

## 2023-07-24 PROCEDURE — 3600007502: Performed by: INTERNAL MEDICINE

## 2023-07-24 PROCEDURE — 88305 TISSUE EXAM BY PATHOLOGIST: CPT

## 2023-07-24 PROCEDURE — 2580000003 HC RX 258: Performed by: INTERNAL MEDICINE

## 2023-07-24 PROCEDURE — 3700000001 HC ADD 15 MINUTES (ANESTHESIA): Performed by: INTERNAL MEDICINE

## 2023-07-24 PROCEDURE — 2709999900 HC NON-CHARGEABLE SUPPLY: Performed by: INTERNAL MEDICINE

## 2023-07-24 PROCEDURE — 3700000000 HC ANESTHESIA ATTENDED CARE: Performed by: INTERNAL MEDICINE

## 2023-07-24 PROCEDURE — 2500000003 HC RX 250 WO HCPCS: Performed by: ANESTHESIOLOGY

## 2023-07-24 PROCEDURE — 3600007512: Performed by: INTERNAL MEDICINE

## 2023-07-24 PROCEDURE — 7100000011 HC PHASE II RECOVERY - ADDTL 15 MIN: Performed by: INTERNAL MEDICINE

## 2023-07-24 RX ORDER — SODIUM CHLORIDE 9 MG/ML
25 INJECTION, SOLUTION INTRAVENOUS PRN
Status: DISCONTINUED | OUTPATIENT
Start: 2023-07-24 | End: 2023-07-24 | Stop reason: HOSPADM

## 2023-07-24 RX ORDER — LIDOCAINE HYDROCHLORIDE 20 MG/ML
INJECTION, SOLUTION EPIDURAL; INFILTRATION; INTRACAUDAL; PERINEURAL PRN
Status: DISCONTINUED | OUTPATIENT
Start: 2023-07-24 | End: 2023-07-24 | Stop reason: SDUPTHER

## 2023-07-24 RX ORDER — SODIUM CHLORIDE 0.9 % (FLUSH) 0.9 %
5-40 SYRINGE (ML) INJECTION EVERY 12 HOURS SCHEDULED
Status: DISCONTINUED | OUTPATIENT
Start: 2023-07-24 | End: 2023-07-24 | Stop reason: HOSPADM

## 2023-07-24 RX ORDER — SODIUM CHLORIDE 0.9 % (FLUSH) 0.9 %
5-40 SYRINGE (ML) INJECTION PRN
Status: DISCONTINUED | OUTPATIENT
Start: 2023-07-24 | End: 2023-07-24 | Stop reason: HOSPADM

## 2023-07-24 RX ORDER — SODIUM CHLORIDE 9 MG/ML
INJECTION, SOLUTION INTRAVENOUS CONTINUOUS
Status: DISCONTINUED | OUTPATIENT
Start: 2023-07-24 | End: 2023-07-24 | Stop reason: HOSPADM

## 2023-07-24 RX ADMIN — PROPOFOL 40 MG: 10 INJECTION, EMULSION INTRAVENOUS at 12:16

## 2023-07-24 RX ADMIN — PROPOFOL 20 MG: 10 INJECTION, EMULSION INTRAVENOUS at 12:31

## 2023-07-24 RX ADMIN — PROPOFOL 20 MG: 10 INJECTION, EMULSION INTRAVENOUS at 12:22

## 2023-07-24 RX ADMIN — PROPOFOL 20 MG: 10 INJECTION, EMULSION INTRAVENOUS at 12:20

## 2023-07-24 RX ADMIN — PROPOFOL 10 MG: 10 INJECTION, EMULSION INTRAVENOUS at 12:28

## 2023-07-24 RX ADMIN — PROPOFOL 10 MG: 10 INJECTION, EMULSION INTRAVENOUS at 12:39

## 2023-07-24 RX ADMIN — PROPOFOL 20 MG: 10 INJECTION, EMULSION INTRAVENOUS at 12:34

## 2023-07-24 RX ADMIN — LIDOCAINE HYDROCHLORIDE 60 MG: 20 INJECTION, SOLUTION EPIDURAL; INFILTRATION; INTRACAUDAL; PERINEURAL at 12:16

## 2023-07-24 RX ADMIN — SODIUM CHLORIDE: 9 INJECTION, SOLUTION INTRAVENOUS at 12:17

## 2023-07-24 RX ADMIN — PROPOFOL 20 MG: 10 INJECTION, EMULSION INTRAVENOUS at 12:18

## 2023-07-24 RX ADMIN — PROPOFOL 10 MG: 10 INJECTION, EMULSION INTRAVENOUS at 12:36

## 2023-07-24 NOTE — PERIOP NOTE

## 2023-07-24 NOTE — INTERVAL H&P NOTE
Pre-Endoscopy H&P Update  Chief complaint/HPI/ROS:  The indication for the procedure, the patient's history and the patient's current medications are reviewed prior to the procedure and that data is reported on the H&P to which this document is attached. Any significant complaints with regard to organ systems will be noted, and if not mentioned then a review of systems is not contributory. Past Medical History:   Diagnosis Date    Atrial fibrillation ([address])     DVT (deep venous thrombosis) ([facility])       Past Surgical History:   Procedure Laterality Date    CHOLECYSTECTOMY      TONSILLECTOMY AND ADENOIDECTOMY       Social   Social History     Tobacco Use    Smoking status: Never    Smokeless tobacco: Never   Substance Use Topics    Alcohol use: Never      No family history on file. No Known Allergies   Prior to Admission Medications   Prescriptions Last Dose Informant Patient Reported? Taking?   indomethacin (INDOCIN) 50 MG capsule   No No   Sig: Take 1 capsule by mouth 2 times daily (with meals)   methylPREDNISolone (MEDROL DOSEPACK) 4 MG tablet   Yes No   Sig: Take as needed for gout flare. rivaroxaban (XARELTO) 10 MG TABS tablet   Yes No   Sig: Take 1 tablet by mouth daily      Facility-Administered Medications: None       PHYSICAL EXAM:  The patient is examined immediately prior to the procedure. There were no vitals filed for this visit. Gen: Appears comfortable, no distress. Pulm: comfortable respirations with no abnormal audible breath sounds  HEART: well perfused, no abnormal audible heart sounds  GI: abdomen flat. PLAN:  Informed consent discussion held, patient afforded an opportunity to ask questions and all questions answered. After being advised of the risks, benefits, and alternatives, the patient requested that we proceed and indicated so on a written consent form. Will proceed with procedure as planned.   Yi Fallon MD

## 2023-07-24 NOTE — OP NOTE
[facility]  [facility] - [address] [facility]  MEHNAZ Feng Rodriguez MD  [phone]      2023    Esophagogastroduodenoscopy & Colonoscopy Procedure Note  Yahir Suarez  : 1932  [facility] Medical Record Number: [id number]      Indications:   Abnormal CT GI tract, personal history of colon polyps  Referring Physician:  Millie Villatoro,   Anesthesia/Sedation: See Anesthesia Record  Endoscopist:  Dr. Brian Nicole  Complications:  None  Estimated Blood Loss:  None    Surgical assistant: Circulator: Vamsi Keating. Ester Pérez, BRANDO  Endoscopy Technician: Aleisha Hernandez none unless otherwise specified. Permit:  The indications, risks, benefits and alternatives were reviewed with the patient or their decision maker who was provided an opportunity to ask questions and all questions were answered. The specific risks of esophagogastroduodenoscopy and colonoscopy with conscious sedation were reviewed, including but not limited to anesthetic complication, bleeding, adverse drug reaction, missed lesion, infection, IV site reactions, and intestinal perforation which would lead to the need for surgical repair. Alternatives to EGD and colonoscopy including radiographic imaging, observation without testing, or laboratory testing were reviewed as well as the limitations of those alternatives discussed. After considering the options and having all their questions answered, the patient or their decision maker provided both verbal and written consent to proceed. -----------EGD------------   Procedure in Detail:  After obtaining informed consent, positioning of the patient in the left lateral decubitus position, and conduction of a pre-procedure pause or \"time out\" the endoscope was introduced into the mouth and advanced to the duodenum.   A careful inspection was made, and findings or interventions are described

## 2023-07-24 NOTE — ANESTHESIA POSTPROCEDURE EVALUATION
Department of Anesthesiology  Postprocedure Note    Patient: Lucia Mcfarland  MRN: [id number]  YOB: 1932  Date of evaluation: 7/24/2023      Procedure Summary     Date: 07/24/23 Room / Location: [facility] ENDO  / [facility] ENDOSCOPY    Anesthesia Start: 1214 Anesthesia Stop: 1249    Procedures:       EGD ESOPHAGOGASTRODUODENOSCOPY, COLONOSCOPY      EGD BIOPSY      COLONOSCOPY POLYPECTOMY SNARE/COLD BIOPSY Diagnosis:       Special screening for malignant neoplasms, colon      Abnormal CT of the abdomen      (Special screening for malignant neoplasms, colon [Z12.11])      (Abnormal CT of the abdomen [R93.5])    Surgeons: Ashia Aguilar MD Responsible Provider: Osiris Parish DO    Anesthesia Type: MAC ASA Status: 3          Anesthesia Type: MAC    Caity Phase I:      Caity Phase II: Caity Score: 10      Anesthesia Post Evaluation    Patient location during evaluation: PACU  Level of consciousness: awake  Airway patency: patent  Nausea & Vomiting: no nausea  Complications: no  Cardiovascular status: hemodynamically stable  Respiratory status: acceptable  Hydration status: stable  Multimodal analgesia pain management approach

## 2023-07-24 NOTE — H&P
80 y.o. male presents for diagnostic upper endoscopy and colonoscopy for abnormal CT of the stomach and screening colonoscopy. Additional H&P data will be attached on the day of procedure.     Christa eSo MD

## 2023-07-24 NOTE — DISCHARGE INSTRUCTIONS
[facility]  [facility] - [address] Aroldo Cavazos MD  [phone]      July 24, 2023     Candelaria Vizcarra  YOB: 1932    ENDOSCOPY/COLONOSCOPY DISCHARGE INSTRUCTIONS    If there is redness at IV site you should apply warm compress to area. If redness or soreness persist contact Dr. Pantera Fink or your primary care doctor. Gaseous discomfort may develop, but walking, belching will help relieve this. There may be a slight amount of blood passed from the rectum. Gaseous discomfort may develop, but walking, belching will help relieve this. You may not operate a vehicle for 12 hours  You may not operate machinery or dangerous appliances for rest of today  You may not drink alcoholic beverages for 12 hours  Avoid making any critical decisions for 24 hours    DIET:  You may resume your normal diet, but some patients find that heavy or large meals may lead to indigestion or vomiting. I suggest a light meal as first food intake. MEDICATIONS:  The use of some over-the-counter pain medication may lead to bleeding after biopsies or other procedures you may have had done. Tylenol (also called acetaminophen) is safe to take and will not lead to bleeding. Based on the procedure you had today you may safely take aspirin or aspirin-like products for the next seven (7) days. ACTIVITY:  You may resume your normal household activities, but it is recommended that you spend the remainder of the day resting -  avoid any strenuous activity. CALL DR. Francis Ko OFFICE IF:  Increasing pain, nausea, vomiting  Abdominal distension (swelling)  Significant new or increased bleeding (oral or rectal)  Fever/Chills  Chest pain/shortness of breath                   Additional instructions:   Impressions:  EGD:  Small 2 cm hiatal hernia. Mild erosive disease in the duodenal bulb. Biopsies taken from the stomach for the H pylori.

## 2023-08-11 NOTE — CONSULTS
Cardiology Consult Note    CC: CP  Reason for consult:  CP  Requesting MD:  Dr. Renita Jane:      Date of  Admission: 9/1/2022  3:33 AM     Admission type:Emergency    Rebecca Lee is a 80 y.o. male admitted for Chest pain [R07.9]. Patient complains of three episodes of Rt side pain from waist to upper chest area with radiation into his Rt shoulder blade area accompanied by nausea and relieved after burp and belching or passing flatus. It is not exertional and actually most of them occurred at rest and lasted for about three to four hours, usually after burp or passing flatus. He has permanent Afib and on Xarelto 15 mg for four years. His HR usually runs low in 40s but usually low 50s. No dizziness. Patient Active Problem List    Diagnosis Date Noted    Chest pain 09/01/2022    Atrial fibrillation ([facility] [address] 08/04/2021    Bradycardia 08/03/2021      None  Past Medical History:   Diagnosis Date    Atrial fibrillation [facility])       Past Surgical History:   Procedure Laterality Date    HX CHOLECYSTECTOMY      HX TONSIL AND ADENOIDECTOMY       No Known Allergies   History reviewed. No pertinent family history. Current Facility-Administered Medications   Medication Dose Route Frequency    rivaroxaban (XARELTO) tablet 15 mg  15 mg Oral DAILY    sodium chloride (NS) flush 5-40 mL  5-40 mL IntraVENous Q8H    sodium chloride (NS) flush 5-40 mL  5-40 mL IntraVENous PRN    0.9% sodium chloride infusion  50 mL/hr IntraVENous CONTINUOUS    aspirin chewable tablet 81 mg  81 mg Oral DAILY    nitroglycerin (NITROSTAT) tablet 0.4 mg  0.4 mg SubLINGual Q5MIN PRN    acetaminophen (TYLENOL) tablet 650 mg  650 mg Oral Q4H PRN        Prior to Admission Medications:  Prior to Admission medications    Medication Sig Start Date End Date Taking? Authorizing Provider   rivaroxaban (Xarelto) 10 mg tablet Take 10 mg by mouth daily.     Other, MD Charlette        Review of Symptoms:  Except as noted in HPI, patient denies recent fever or Patient is calling today stating she has been having difficulty urinating and emptying his bladder completely. Patient is asking for a prescription of tamsulosin. Please review.      [phone] (home) chills, nausea, vomiting, diarrhea, hemoptysis, hematemesis, dysuria, myalgias, focal neurologic symptoms, ecchymosis, angioedema, odynophagia, dysphagia, sore throat, earache,rash, melena, hematochezia, depression, GERD, cold intolerance, petechia, bleeding gums, or significant weight loss. He normal exercises on regular basis and walks more than 2 miles a day with no difficulty. A comprehensive review of systems was negative except for that written in the HPI. Subjective:    24 hr VS reviewed, overall VSSAF  Temp (24hrs), Av.1 °F (36.7 °C), Min:97.6 °F (36.4 °C), Max:98.8 °F (37.1 °C)    Patient Vitals for the past 8 hrs:   Pulse   22 1403 (!) 46   22 1202 (!) 46   22 1038 (!) 45   22 0942 (!) 50   22 0901 (!) 56   22 0846 66   22 0833 (!) 56   22 0832 62   22 0755 (!) 53   22 0700 (!) 54   22 0645 (!) 54    Patient Vitals for the past 8 hrs:   Resp   22 1202 12   22 1038 14   22 0942 16   22 0901 21   22 0846 19   22 0833 18   22 0832 23   22 0802 11   22 0755 13   22 0700 15   22 0645 15    Patient Vitals for the past 8 hrs:   BP   22 1202 134/65   22 1038 (!) 143/54   22 0901 123/65   22 0846 128/72   22 0802 (!) 129/59   22 0700 (!) 142/75        No intake or output data in the 24 hours ending 22 1413      Physical Exam (complete single organ system exam)        Visit Vitals  /65 (BP 1 Location: Right upper arm, BP Patient Position: Semi fowlers)   Pulse (!) 46   Temp 97.6 °F (36.4 °C)   Resp 12   Ht 5' 9\" (1.753 m)   Wt 175 lb 14.8 oz (79.8 kg)   SpO2 99%   BMI 25.98 kg/m²     General Appearance:  Well developed, well nourished,alert and oriented x 3, and individual in no acute distress. Ears/Nose/Mouth/Throat:   Hearing grossly normal.         Neck: Supple. Chest:   Lungs clear to auscultation bilaterally. Cardiovascular:  irregular rate and rhythm, S1, S2 normal, no murmur. Abdomen:   Soft, non-tender, bowel sounds are active. Extremities: traceedema bilaterally. Skin: Warm and dry. Cardiographics    Telemetry: AFIB  ECG: atrial fibrillation, rate 45  Echocardiogram: Not done    Labs:   Recent Results (from the past 24 hour(s))   CBC WITH AUTOMATED DIFF    Collection Time: 09/01/22  3:51 AM   Result Value Ref Range    WBC 7.5 4.1 - 11.1 K/uL    RBC 4.75 4.10 - 5.70 M/uL    HGB 15.5 12.1 - 17.0 g/dL    HCT 45.3 36.6 - 50.3 %    MCV 95.4 80.0 - 99.0 FL    MCH 32.6 26.0 - 34.0 PG    MCHC 34.2 30.0 - 36.5 g/dL    RDW 13.2 11.5 - 14.5 %    PLATELET 92 (L) 609 - 400 K/uL    MPV 12.6 8.9 - 12.9 FL    NRBC 0.0 0  WBC    ABSOLUTE NRBC 0.00 0.00 - 0.01 K/uL    NEUTROPHILS 78 (H) 32 - 75 %    LYMPHOCYTES 14 12 - 49 %    MONOCYTES 6 5 - 13 %    EOSINOPHILS 1 0 - 7 %    BASOPHILS 0 0 - 1 %    IMMATURE GRANULOCYTES 1 (H) 0.0 - 0.5 %    ABS. NEUTROPHILS 5.9 1.8 - 8.0 K/UL    ABS. LYMPHOCYTES 1.1 0.8 - 3.5 K/UL    ABS. MONOCYTES 0.4 0.0 - 1.0 K/UL    ABS. EOSINOPHILS 0.1 0.0 - 0.4 K/UL    ABS. BASOPHILS 0.0 0.0 - 0.1 K/UL    ABS. IMM. GRANS. 0.0 0.00 - 0.04 K/UL    DF AUTOMATED     METABOLIC PANEL, COMPREHENSIVE    Collection Time: 09/01/22  3:51 AM   Result Value Ref Range    Sodium 136 136 - 145 mmol/L    Potassium 4.0 3.5 - 5.1 mmol/L    Chloride 99 97 - 108 mmol/L    CO2 25 21 - 32 mmol/L    Anion gap 12 5 - 15 mmol/L    Glucose 123 (H) 65 - 100 mg/dL    BUN 21 (H) 6 - 20 MG/DL    Creatinine 1.39 (H) 0.70 - 1.30 MG/DL    BUN/Creatinine ratio 15 12 - 20      GFR est AA 58 (L) >60 ml/min/1.73m2    GFR est non-AA 48 (L) >60 ml/min/1.73m2    Calcium 9.3 8.5 - 10.1 MG/DL    Bilirubin, total 1.3 (H) 0.2 - 1.0 MG/DL    ALT (SGPT) 23 12 - 78 U/L    AST (SGOT) 28 15 - 37 U/L    Alk.  phosphatase 82 45 - 117 U/L    Protein, total 7.2 6.4 - 8.2 g/dL    Albumin 4.0 3.5 - 5.0 g/dL    Globulin 3.2 2.0 - 4.0 g/dL A-G Ratio 1.3 1.1 - 2.2     MAGNESIUM    Collection Time: 09/01/22  3:51 AM   Result Value Ref Range    Magnesium 1.9 1.6 - 2.4 mg/dL   NT-PRO BNP    Collection Time: 09/01/22  3:51 AM   Result Value Ref Range    NT pro-BNP 1,639 (H) 0 - 450 PG/ML   PHOSPHORUS    Collection Time: 09/01/22  3:51 AM   Result Value Ref Range    Phosphorus 4.0 2.6 - 4.7 MG/DL   TROPONIN-HIGH SENSITIVITY    Collection Time: 09/01/22  3:51 AM   Result Value Ref Range    Troponin-High Sensitivity 20 0 - 76 ng/L   CREATININE, POC    Collection Time: 09/01/22  3:59 AM   Result Value Ref Range    Creatinine (POC) 1.08 0.51 - 1.19 mg/dL    GFRAA, POC >60 >60 ml/min/1.73m2    GFRNA, POC >60 >60 ml/min/1.73m2   PROTHROMBIN TIME + INR    Collection Time: 09/01/22  4:09 AM   Result Value Ref Range    INR 1.5 (H) 0.9 - 1.1      Prothrombin time 14.4 (H) 9.0 - 11.1 sec   PTT    Collection Time: 09/01/22  4:09 AM   Result Value Ref Range    aPTT 36.2 (H) 22.1 - 31.0 sec    aPTT, therapeutic range     58.0 - 77.0 SECS   D DIMER    Collection Time: 09/01/22  4:09 AM   Result Value Ref Range    D-dimer 0.38 0.00 - 0.65 mg/L FEU   TROPONIN-HIGH SENSITIVITY    Collection Time: 09/01/22  6:18 AM   Result Value Ref Range    Troponin-High Sensitivity 26 0 - 76 ng/L   URINALYSIS W/ REFLEX CULTURE    Collection Time: 09/01/22  7:07 AM    Specimen: Urine   Result Value Ref Range    Color YELLOW/STRAW      Appearance CLEAR CLEAR      Specific gravity 1.010 1.003 - 1.030      pH (UA) 7.0 5.0 - 8.0      Protein Negative NEG mg/dL    Glucose Negative NEG mg/dL    Ketone Negative NEG mg/dL    Bilirubin Negative NEG      Blood Negative NEG      Urobilinogen 0.2 0.2 - 1.0 EU/dL    Nitrites Negative NEG      Leukocyte Esterase Negative NEG      WBC 0-4 0 - 4 /hpf    RBC 0-5 0 - 5 /hpf    Epithelial cells FEW FEW /lpf    Bacteria Negative NEG /hpf    UA:UC IF INDICATED CULTURE NOT INDICATED BY UA RESULT          Assessment:     Assessment:   CP; atypical; it is more related to GI  Afib; permanent with slow HR; asymptomatic; a pacemaker is not indicated at this time     Plan:   Tele  Stress test in am  Dominguez Lacy MD

## 2023-10-05 ENCOUNTER — HOSPITAL ENCOUNTER (OUTPATIENT)
Facility: HOSPITAL | Age: 88
Discharge: HOME | End: 2023-10-05
Attending: FAMILY MEDICINE
Payer: MEDICARE

## 2023-10-05 ENCOUNTER — OFFICE VISIT (OUTPATIENT)
Dept: PRIMARY CARE CLINIC | Facility: CLINIC | Age: 88
End: 2023-10-05

## 2023-10-05 ENCOUNTER — NURSE TRIAGE (OUTPATIENT)
Dept: OTHER | Facility: CLINIC | Age: 88
End: 2023-10-05

## 2023-10-05 VITALS
HEART RATE: 50 BPM | TEMPERATURE: 97.6 F | HEIGHT: 70 IN | BODY MASS INDEX: 25.77 KG/M2 | RESPIRATION RATE: 17 BRPM | DIASTOLIC BLOOD PRESSURE: 63 MMHG | WEIGHT: 180 LBS | SYSTOLIC BLOOD PRESSURE: 109 MMHG | OXYGEN SATURATION: 97 %

## 2023-10-05 DIAGNOSIS — R04.2 HEMOPTYSIS: ICD-10-CM

## 2023-10-05 DIAGNOSIS — R04.2 HEMOPTYSIS: Primary | ICD-10-CM

## 2023-10-05 LAB
INR PPP: 1.1 (ref 0.9–1.1)
PROTHROMBIN TIME: 11.9 SEC (ref 9–11.1)

## 2023-10-05 PROCEDURE — 71046 X-RAY EXAM CHEST 2 VIEWS: CPT

## 2023-10-05 RX ORDER — INDOMETHACIN 50 MG/1
50 CAPSULE ORAL 2 TIMES DAILY WITH MEALS
Qty: 14 CAPSULE | Refills: 0 | Status: SHIPPED | OUTPATIENT
Start: 2023-10-05

## 2023-10-05 SDOH — ECONOMIC STABILITY: FOOD INSECURITY: WITHIN THE PAST 12 MONTHS, THE FOOD YOU BOUGHT JUST DIDN'T LAST AND YOU DIDN'T HAVE MONEY TO GET MORE.: PATIENT DECLINED

## 2023-10-05 SDOH — ECONOMIC STABILITY: FOOD INSECURITY: WITHIN THE PAST 12 MONTHS, YOU WORRIED THAT YOUR FOOD WOULD RUN OUT BEFORE YOU GOT MONEY TO BUY MORE.: PATIENT DECLINED

## 2023-10-05 SDOH — ECONOMIC STABILITY: HOUSING INSECURITY
IN THE LAST 12 MONTHS, WAS THERE A TIME WHEN YOU DID NOT HAVE A STEADY PLACE TO SLEEP OR SLEPT IN A SHELTER (INCLUDING NOW)?: PATIENT REFUSED

## 2023-10-05 SDOH — ECONOMIC STABILITY: INCOME INSECURITY: HOW HARD IS IT FOR YOU TO PAY FOR THE VERY BASICS LIKE FOOD, HOUSING, MEDICAL CARE, AND HEATING?: PATIENT DECLINED

## 2023-10-05 ASSESSMENT — PATIENT HEALTH QUESTIONNAIRE - PHQ9
2. FEELING DOWN, DEPRESSED OR HOPELESS: 0
SUM OF ALL RESPONSES TO PHQ9 QUESTIONS 1 & 2: 0
SUM OF ALL RESPONSES TO PHQ QUESTIONS 1-9: 0
1. LITTLE INTEREST OR PLEASURE IN DOING THINGS: 0

## 2023-10-05 NOTE — TELEPHONE ENCOUNTER
Location of patient: [place]    Received call from Malgorzata at [facility] with [employer]. Subjective: Caller states \"coughing up some blood with mucus \"     Current Symptoms: coughed up black mucus 4 days ago then mostly black  mucus with specs of blood throat feels sore hx afib, bradycardia is on xarelto , did have an infection in his jaw recently bur did not seek treatment took supplements , no dif breathing no cp     Onset: 10 days     Associated Symptoms: NA    Pain Severity: 1/10    Temperature: none       What has been tried: supplements     LMP: NA Pregnant: NA    Recommended disposition: See in Office Today    Care advice provided, patient verbalizes understanding; denies any other questions or concerns; instructed to call back for any new or worsening symptoms. Patient/Caller agrees with recommended disposition; writer provided warm transfer to david at [facility] for appointment scheduling    Attention Provider: Thank you for allowing me to participate in the care of your patient. The patient was connected to triage in response to information provided to the ECC/PSC. Please do not respond through this encounter as the response is not directed to a shared pool.          Reason for Disposition   Taking Coumadin (warfarin) or other strong blood thinner, or known bleeding disorder (e.g., thrombocytopenia)    Protocols used: Coughing Up Blood-ADULT-[place]

## 2023-10-06 LAB
APTT PPP: 31.3 SEC (ref 22.1–31)
ERYTHROCYTE [DISTWIDTH] IN BLOOD BY AUTOMATED COUNT: 13.2 % (ref 11.5–14.5)
HCT VFR BLD AUTO: 41.8 % (ref 36.6–50.3)
HGB BLD-MCNC: 14 G/DL (ref 12.1–17)
MCH RBC QN AUTO: 32.9 PG (ref 26–34)
MCHC RBC AUTO-ENTMCNC: 33.5 G/DL (ref 30–36.5)
MCV RBC AUTO: 98.1 FL (ref 80–99)
NRBC # BLD: 0 K/UL (ref 0–0.01)
NRBC BLD-RTO: 0 PER 100 WBC
PLATELET # BLD AUTO: 117 K/UL (ref 150–400)
PMV BLD AUTO: 12.1 FL (ref 8.9–12.9)
RBC # BLD AUTO: 4.26 M/UL (ref 4.1–5.7)
THERAPEUTIC RANGE: ABNORMAL SECS (ref 58–77)
WBC # BLD AUTO: 5 K/UL (ref 4.1–11.1)

## 2024-03-21 ENCOUNTER — TELEPHONE (OUTPATIENT)
Dept: PRIMARY CARE CLINIC | Facility: CLINIC | Age: 89
End: 2024-03-21

## 2024-03-21 NOTE — TELEPHONE ENCOUNTER
Called patient to schedule MWV. Patient was driving at the time of phone call, and will call back to schedule this. Patient can get mWV done at anytime. Please schedule patient when Dr. Rivera returns from Maternity leave.

## 2024-08-21 SDOH — HEALTH STABILITY: PHYSICAL HEALTH: ON AVERAGE, HOW MANY MINUTES DO YOU ENGAGE IN EXERCISE AT THIS LEVEL?: 50 MIN

## 2024-08-21 SDOH — HEALTH STABILITY: PHYSICAL HEALTH: ON AVERAGE, HOW MANY DAYS PER WEEK DO YOU ENGAGE IN MODERATE TO STRENUOUS EXERCISE (LIKE A BRISK WALK)?: 4 DAYS

## 2024-08-21 ASSESSMENT — PATIENT HEALTH QUESTIONNAIRE - PHQ9
SUM OF ALL RESPONSES TO PHQ QUESTIONS 1-9: 0
SUM OF ALL RESPONSES TO PHQ9 QUESTIONS 1 & 2: 0
2. FEELING DOWN, DEPRESSED OR HOPELESS: NOT AT ALL
SUM OF ALL RESPONSES TO PHQ QUESTIONS 1-9: 0
SUM OF ALL RESPONSES TO PHQ QUESTIONS 1-9: 0
1. LITTLE INTEREST OR PLEASURE IN DOING THINGS: NOT AT ALL
SUM OF ALL RESPONSES TO PHQ QUESTIONS 1-9: 0

## 2024-08-21 ASSESSMENT — LIFESTYLE VARIABLES
HOW OFTEN DO YOU HAVE A DRINK CONTAINING ALCOHOL: 1
HOW MANY STANDARD DRINKS CONTAINING ALCOHOL DO YOU HAVE ON A TYPICAL DAY: PATIENT DOES NOT DRINK
HOW OFTEN DO YOU HAVE A DRINK CONTAINING ALCOHOL: NEVER
HOW MANY STANDARD DRINKS CONTAINING ALCOHOL DO YOU HAVE ON A TYPICAL DAY: 0
HOW OFTEN DO YOU HAVE SIX OR MORE DRINKS ON ONE OCCASION: 1

## 2024-08-22 ENCOUNTER — OFFICE VISIT (OUTPATIENT)
Dept: PRIMARY CARE CLINIC | Facility: CLINIC | Age: 89
End: 2024-08-22

## 2024-08-22 VITALS
OXYGEN SATURATION: 96 % | WEIGHT: 180 LBS | DIASTOLIC BLOOD PRESSURE: 79 MMHG | RESPIRATION RATE: 17 BRPM | HEIGHT: 70 IN | BODY MASS INDEX: 25.77 KG/M2 | SYSTOLIC BLOOD PRESSURE: 149 MMHG | HEART RATE: 50 BPM

## 2024-08-22 DIAGNOSIS — M1A.9XX0 CHRONIC GOUT WITHOUT TOPHUS, UNSPECIFIED CAUSE, UNSPECIFIED SITE: ICD-10-CM

## 2024-08-22 DIAGNOSIS — I10 PRIMARY HYPERTENSION: ICD-10-CM

## 2024-08-22 DIAGNOSIS — D69.6 THROMBOCYTOPENIA, UNSPECIFIED (HCC): ICD-10-CM

## 2024-08-22 DIAGNOSIS — Z13.6 ENCOUNTER FOR LIPID SCREENING FOR CARDIOVASCULAR DISEASE: ICD-10-CM

## 2024-08-22 DIAGNOSIS — I48.91 ATRIAL FIBRILLATION, UNSPECIFIED TYPE (HCC): ICD-10-CM

## 2024-08-22 DIAGNOSIS — Z13.220 ENCOUNTER FOR LIPID SCREENING FOR CARDIOVASCULAR DISEASE: ICD-10-CM

## 2024-08-22 DIAGNOSIS — Z12.5 ENCOUNTER FOR SCREENING FOR MALIGNANT NEOPLASM OF PROSTATE: ICD-10-CM

## 2024-08-22 DIAGNOSIS — R04.2 HEMOPTYSIS: Primary | ICD-10-CM

## 2024-08-22 DIAGNOSIS — Z00.00 MEDICARE ANNUAL WELLNESS VISIT, SUBSEQUENT: ICD-10-CM

## 2024-08-22 RX ORDER — INDOMETHACIN 50 MG/1
50 CAPSULE ORAL 2 TIMES DAILY WITH MEALS
Qty: 14 CAPSULE | Refills: 1 | Status: SHIPPED | OUTPATIENT
Start: 2024-08-22

## 2024-08-22 NOTE — PROGRESS NOTES
Medicare Annual Wellness Visit    David Fregoso is here for Medicare AWV    1. Hemoptysis  CXR was okay in 2023. Will get chest CT. Has a hx of pulm nodules dating back to 2009. No hx of smoking but heavy second hand smoke exposure when younger.   - CT CHEST WO CONTRAST; Future  - CBC with Auto Differential; Future    2. Medicare annual wellness visit, subsequent    3. Atrial fibrillation, unspecified type (HCC)  He is followed by Cards. On Xarelto. Not on rate control med. HR 50s. States he is not big on taking meds.   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 Feels if he focuses on diet/ hydration he can control it. Discussed risks of indomethacin + AC increases risk of bleeding. We discussed steroids in past but he has been taking this regimen for a long time. Desires refill of his Indomethacin at this time.   - Comprehensive Metabolic Panel; Future  - Uric Acid; Future  - indomethacin (INDOCIN) 50 MG capsule; Take 1 capsule by mouth 2 times daily (with meals)  Dispense: 14 capsule; Refill: 1    5. Encounter for screening for malignant neoplasm of prostate  - PSA Screening; Future    6. Encounter for lipid screening for cardiovascular disease  - Lipid Panel; Future    7. Thrombocytopenia, unspecified (HCC)  Followed by Dr. Ash. Check CBC.     8. Primary hypertension  Not on meds. Has been occasionally low in office. States he is not a big medication person.      Recommendations for Preventive Services Due: see orders and patient instructions/AVS.  Recommended screening schedule for the next 5-10 years is provided to the patient in written form: see Patient Instructions/AVS.     No follow-ups on file.     Subjective     A Fib - Has been on Xarelto. Followed by Cardiology. No issues with bleeding.      Gout - He has a hx of gout.

## 2024-08-22 NOTE — PROGRESS NOTES
Health Decision Maker has been checked with the patient     Primary Decision Maker: Audra Fregoso - Spouse - [phone]    Secondary Decision Maker: Shaye Fregoso - Child - [phone]       AI form was signed    Chief Complaint   Patient presents with    Medicare AWV       \"Have you been to the ER, urgent care clinic since your last visit?  Hospitalized since your last visit?\"    NO    “Have you seen or consulted any other health care providers outside of [facility] since your last visit?”    NO      Vitals:    08/22/24 0942   BP: (!) 149/79   Site: Left Upper Arm   Position: Sitting   Cuff Size: Medium Adult   Pulse: 50   Resp: 17   SpO2: 96%   Weight: 81.6 kg (180 lb)   Height: 1.778 m (5' 10\")      Depression: Not at risk (8/21/2024)    PHQ-2     PHQ-2 Score: 0              Click Here for Release of Records Request        Chart reviewed: immunizations are documented.     There is no immunization history on file for this patient.

## 2024-08-29 DIAGNOSIS — Z13.6 ENCOUNTER FOR LIPID SCREENING FOR CARDIOVASCULAR DISEASE: ICD-10-CM

## 2024-08-29 DIAGNOSIS — R04.2 HEMOPTYSIS: ICD-10-CM

## 2024-08-29 DIAGNOSIS — Z12.5 ENCOUNTER FOR SCREENING FOR MALIGNANT NEOPLASM OF PROSTATE: ICD-10-CM

## 2024-08-29 DIAGNOSIS — M1A.9XX0 CHRONIC GOUT WITHOUT TOPHUS, UNSPECIFIED CAUSE, UNSPECIFIED SITE: ICD-10-CM

## 2024-08-29 DIAGNOSIS — Z13.220 ENCOUNTER FOR LIPID SCREENING FOR CARDIOVASCULAR DISEASE: ICD-10-CM

## 2024-08-29 DIAGNOSIS — I48.91 ATRIAL FIBRILLATION, UNSPECIFIED TYPE (HCC): ICD-10-CM

## 2024-08-29 LAB
ALBUMIN SERPL-MCNC: 3.5 G/DL (ref 3.5–5)
ALBUMIN/GLOB SERPL: 1.3 (ref 1.1–2.2)
ALP SERPL-CCNC: 77 U/L (ref 45–117)
ALT SERPL-CCNC: 18 U/L (ref 12–78)
ANION GAP SERPL CALC-SCNC: 5 MMOL/L (ref 5–15)
AST SERPL-CCNC: 14 U/L (ref 15–37)
BASOPHILS # BLD: 0 K/UL (ref 0–0.1)
BASOPHILS NFR BLD: 0 % (ref 0–1)
BILIRUB SERPL-MCNC: 1.6 MG/DL (ref 0.2–1)
BUN SERPL-MCNC: 23 MG/DL (ref 6–20)
BUN/CREAT SERPL: 20 (ref 12–20)
CALCIUM SERPL-MCNC: 8.9 MG/DL (ref 8.5–10.1)
CHLORIDE SERPL-SCNC: 107 MMOL/L (ref 97–108)
CHOLEST SERPL-MCNC: 110 MG/DL
CO2 SERPL-SCNC: 27 MMOL/L (ref 21–32)
CREAT SERPL-MCNC: 1.15 MG/DL (ref 0.7–1.3)
DIFFERENTIAL METHOD BLD: ABNORMAL
EOSINOPHIL # BLD: 0.1 K/UL (ref 0–0.4)
EOSINOPHIL NFR BLD: 2 % (ref 0–7)
ERYTHROCYTE [DISTWIDTH] IN BLOOD BY AUTOMATED COUNT: 13.3 % (ref 11.5–14.5)
GLOBULIN SER CALC-MCNC: 2.8 G/DL (ref 2–4)
GLUCOSE SERPL-MCNC: 94 MG/DL (ref 65–100)
HCT VFR BLD AUTO: 39.8 % (ref 36.6–50.3)
HDLC SERPL-MCNC: 56 MG/DL
HDLC SERPL: 2 (ref 0–5)
HGB BLD-MCNC: 13.6 G/DL (ref 12.1–17)
IMM GRANULOCYTES # BLD AUTO: 0 K/UL (ref 0–0.04)
IMM GRANULOCYTES NFR BLD AUTO: 0 % (ref 0–0.5)
LDLC SERPL CALC-MCNC: 47.6 MG/DL (ref 0–100)
LYMPHOCYTES # BLD: 0.6 K/UL (ref 0.8–3.5)
LYMPHOCYTES NFR BLD: 13 % (ref 12–49)
MCH RBC QN AUTO: 33.2 PG (ref 26–34)
MCHC RBC AUTO-ENTMCNC: 34.2 G/DL (ref 30–36.5)
MCV RBC AUTO: 97.1 FL (ref 80–99)
MONOCYTES # BLD: 0.3 K/UL (ref 0–1)
MONOCYTES NFR BLD: 7 % (ref 5–13)
NEUTS SEG # BLD: 3.7 K/UL (ref 1.8–8)
NEUTS SEG NFR BLD: 78 % (ref 32–75)
NRBC # BLD: 0 K/UL (ref 0–0.01)
NRBC BLD-RTO: 0 PER 100 WBC
PLATELET # BLD AUTO: 82 K/UL (ref 150–400)
PMV BLD AUTO: 12.7 FL (ref 8.9–12.9)
POTASSIUM SERPL-SCNC: 4 MMOL/L (ref 3.5–5.1)
PROT SERPL-MCNC: 6.3 G/DL (ref 6.4–8.2)
PSA SERPL-MCNC: 2.5 NG/ML (ref 0.01–4)
RBC # BLD AUTO: 4.1 M/UL (ref 4.1–5.7)
RBC MORPH BLD: ABNORMAL
SODIUM SERPL-SCNC: 139 MMOL/L (ref 136–145)
TRIGL SERPL-MCNC: 32 MG/DL
URATE SERPL-MCNC: 6.7 MG/DL (ref 3.5–7.2)
VLDLC SERPL CALC-MCNC: 6.4 MG/DL
WBC # BLD AUTO: 4.7 K/UL (ref 4.1–11.1)

## 2024-08-30 DIAGNOSIS — Z12.5 SCREENING FOR PROSTATE CANCER: Primary | ICD-10-CM

## 2025-01-08 ENCOUNTER — APPOINTMENT (OUTPATIENT)
Facility: HOSPITAL | Age: 89
End: 2025-01-08
Payer: MEDICARE

## 2025-01-08 ENCOUNTER — HOSPITAL ENCOUNTER (EMERGENCY)
Facility: HOSPITAL | Age: 89
Discharge: LEFT BEFORE DISCHARGE | End: 2025-01-08
Attending: STUDENT IN AN ORGANIZED HEALTH CARE EDUCATION/TRAINING PROGRAM
Payer: MEDICARE

## 2025-01-08 VITALS
DIASTOLIC BLOOD PRESSURE: 77 MMHG | BODY MASS INDEX: 28.72 KG/M2 | HEART RATE: 50 BPM | OXYGEN SATURATION: 98 % | TEMPERATURE: 97.3 F | RESPIRATION RATE: 18 BRPM | HEIGHT: 70 IN | SYSTOLIC BLOOD PRESSURE: 156 MMHG | WEIGHT: 200.62 LBS

## 2025-01-08 DIAGNOSIS — R06.02 SHORTNESS OF BREATH: ICD-10-CM

## 2025-01-08 DIAGNOSIS — R79.89 ELEVATED BRAIN NATRIURETIC PEPTIDE (BNP) LEVEL: Primary | ICD-10-CM

## 2025-01-08 DIAGNOSIS — M79.89 LEG SWELLING: ICD-10-CM

## 2025-01-08 LAB
ALBUMIN SERPL-MCNC: 3.6 G/DL (ref 3.5–5)
ALBUMIN/GLOB SERPL: 1 (ref 1.1–2.2)
ALP SERPL-CCNC: 87 U/L (ref 45–117)
ALT SERPL-CCNC: 16 U/L (ref 12–78)
ANION GAP SERPL CALC-SCNC: 10 MMOL/L (ref 2–12)
AST SERPL-CCNC: 19 U/L (ref 15–37)
BASOPHILS # BLD: 0.01 K/UL (ref 0–0.1)
BASOPHILS NFR BLD: 0.3 % (ref 0–1)
BILIRUB SERPL-MCNC: 1.6 MG/DL (ref 0.2–1)
BUN SERPL-MCNC: 14 MG/DL (ref 6–20)
BUN/CREAT SERPL: 11 (ref 12–20)
CALCIUM SERPL-MCNC: 9.3 MG/DL (ref 8.5–10.1)
CHLORIDE SERPL-SCNC: 101 MMOL/L (ref 97–108)
CO2 SERPL-SCNC: 28 MMOL/L (ref 21–32)
CREAT SERPL-MCNC: 1.31 MG/DL (ref 0.7–1.3)
D DIMER PPP FEU-MCNC: 0.48 MG/L FEU (ref 0–0.65)
DIFFERENTIAL METHOD BLD: ABNORMAL
EKG DIAGNOSIS: NORMAL
EKG Q-T INTERVAL: 478 MS
EKG QRS DURATION: 88 MS
EKG QTC CALCULATION (BAZETT): 457 MS
EKG R AXIS: 2 DEGREES
EKG T AXIS: 46 DEGREES
EKG VENTRICULAR RATE: 55 BPM
EOSINOPHIL # BLD: 0.06 K/UL (ref 0–0.4)
EOSINOPHIL NFR BLD: 1.6 % (ref 0–7)
ERYTHROCYTE [DISTWIDTH] IN BLOOD BY AUTOMATED COUNT: 14.2 % (ref 11.5–14.5)
FLUAV RNA SPEC QL NAA+PROBE: NOT DETECTED
FLUBV RNA SPEC QL NAA+PROBE: NOT DETECTED
GLOBULIN SER CALC-MCNC: 3.6 G/DL (ref 2–4)
GLUCOSE SERPL-MCNC: 88 MG/DL (ref 65–100)
HCT VFR BLD AUTO: 40.2 % (ref 36.6–50.3)
HGB BLD-MCNC: 13.4 G/DL (ref 12.1–17)
IMM GRANULOCYTES # BLD AUTO: 0.02 K/UL (ref 0–0.04)
IMM GRANULOCYTES NFR BLD AUTO: 0.5 % (ref 0–0.5)
LYMPHOCYTES # BLD: 0.57 K/UL (ref 0.8–3.5)
LYMPHOCYTES NFR BLD: 14.8 % (ref 12–49)
MAGNESIUM SERPL-MCNC: 1.9 MG/DL (ref 1.6–2.4)
MCH RBC QN AUTO: 32.5 PG (ref 26–34)
MCHC RBC AUTO-ENTMCNC: 33.3 G/DL (ref 30–36.5)
MCV RBC AUTO: 97.6 FL (ref 80–99)
MONOCYTES # BLD: 0.3 K/UL (ref 0–1)
MONOCYTES NFR BLD: 7.8 % (ref 5–13)
NEUTS SEG # BLD: 2.89 K/UL (ref 1.8–8)
NEUTS SEG NFR BLD: 75 % (ref 32–75)
NRBC # BLD: 0 K/UL (ref 0–0.01)
NRBC BLD-RTO: 0 PER 100 WBC
NT PRO BNP: 2464 PG/ML (ref 0–450)
PLATELET # BLD AUTO: 105 K/UL (ref 150–400)
PMV BLD AUTO: 11.5 FL (ref 8.9–12.9)
POTASSIUM SERPL-SCNC: 4.1 MMOL/L (ref 3.5–5.1)
PROT SERPL-MCNC: 7.2 G/DL (ref 6.4–8.2)
RBC # BLD AUTO: 4.12 M/UL (ref 4.1–5.7)
SARS-COV-2 RNA RESP QL NAA+PROBE: NOT DETECTED
SODIUM SERPL-SCNC: 139 MMOL/L (ref 136–145)
SOURCE: NORMAL
TROPONIN I SERPL HS-MCNC: 21 NG/L (ref 0–76)
WBC # BLD AUTO: 3.9 K/UL (ref 4.1–11.1)

## 2025-01-08 PROCEDURE — 6360000002 HC RX W HCPCS: Performed by: STUDENT IN AN ORGANIZED HEALTH CARE EDUCATION/TRAINING PROGRAM

## 2025-01-08 PROCEDURE — 84484 ASSAY OF TROPONIN QUANT: CPT

## 2025-01-08 PROCEDURE — 6360000004 HC RX CONTRAST MEDICATION: Performed by: STUDENT IN AN ORGANIZED HEALTH CARE EDUCATION/TRAINING PROGRAM

## 2025-01-08 PROCEDURE — 87636 SARSCOV2 & INF A&B AMP PRB: CPT

## 2025-01-08 PROCEDURE — 71045 X-RAY EXAM CHEST 1 VIEW: CPT

## 2025-01-08 PROCEDURE — 93005 ELECTROCARDIOGRAM TRACING: CPT | Performed by: STUDENT IN AN ORGANIZED HEALTH CARE EDUCATION/TRAINING PROGRAM

## 2025-01-08 PROCEDURE — 96374 THER/PROPH/DIAG INJ IV PUSH: CPT

## 2025-01-08 PROCEDURE — 93010 ELECTROCARDIOGRAM REPORT: CPT | Performed by: INTERNAL MEDICINE

## 2025-01-08 PROCEDURE — 85025 COMPLETE CBC W/AUTO DIFF WBC: CPT

## 2025-01-08 PROCEDURE — 83880 ASSAY OF NATRIURETIC PEPTIDE: CPT

## 2025-01-08 PROCEDURE — 80053 COMPREHEN METABOLIC PANEL: CPT

## 2025-01-08 PROCEDURE — 71275 CT ANGIOGRAPHY CHEST: CPT

## 2025-01-08 PROCEDURE — 36415 COLL VENOUS BLD VENIPUNCTURE: CPT

## 2025-01-08 PROCEDURE — 85379 FIBRIN DEGRADATION QUANT: CPT

## 2025-01-08 PROCEDURE — 83735 ASSAY OF MAGNESIUM: CPT

## 2025-01-08 PROCEDURE — 94761 N-INVAS EAR/PLS OXIMETRY MLT: CPT

## 2025-01-08 PROCEDURE — 99285 EMERGENCY DEPT VISIT HI MDM: CPT

## 2025-01-08 RX ORDER — IOPAMIDOL 755 MG/ML
100 INJECTION, SOLUTION INTRAVASCULAR
Status: COMPLETED | OUTPATIENT
Start: 2025-01-08 | End: 2025-01-08

## 2025-01-08 RX ORDER — FUROSEMIDE 10 MG/ML
20 INJECTION INTRAMUSCULAR; INTRAVENOUS ONCE
Status: COMPLETED | OUTPATIENT
Start: 2025-01-08 | End: 2025-01-08

## 2025-01-08 RX ADMIN — FUROSEMIDE 20 MG: 10 INJECTION, SOLUTION INTRAMUSCULAR; INTRAVENOUS at 13:59

## 2025-01-08 RX ADMIN — IOPAMIDOL 80 ML: 755 INJECTION, SOLUTION INTRAVENOUS at 14:26

## 2025-01-08 ASSESSMENT — PAIN - FUNCTIONAL ASSESSMENT: PAIN_FUNCTIONAL_ASSESSMENT: NONE - DENIES PAIN

## 2025-01-08 NOTE — ED NOTES
ED SIGN OUT NOTE  Care assumed at [facility] 3:10 PM EST    Patient was signed out to me by Dr. Mendez.     Patient is awaiting imaging and final disposition.    BP (!) 156/77   Pulse 50   Temp 97.3 °F (36.3 °C) (Tympanic)   Resp 18   Ht 1.778 m (5' 10\")   Wt 91 kg (200 lb 9.9 oz)   SpO2 98%   BMI 28.79 kg/m²     Labs Reviewed   CBC WITH AUTO DIFFERENTIAL - Abnormal; Notable for the following components:       Result Value    WBC 3.9 (*)     Platelets 105 (*)     Lymphocytes Absolute 0.57 (*)     All other components within normal limits   COMPREHENSIVE METABOLIC PANEL - Abnormal; Notable for the following components:    Creatinine 1.31 (*)     BUN/Creatinine Ratio 11 (*)     Est, Glom Filt Rate 51 (*)     Total Bilirubin 1.6 (*)     Albumin/Globulin Ratio 1.0 (*)     All other components within normal limits   BRAIN NATRIURETIC PEPTIDE - Abnormal; Notable for the following components:    NT Pro-BNP 2,464 (*)     All other components within normal limits   COVID-19 & INFLUENZA COMBO   MAGNESIUM   D-DIMER, QUANTITATIVE   TROPONIN     CTA CHEST W WO CONTRAST PE Eval   Final Result   No pulmonary embolism   Incidental cardiomegaly, coronary artery disease, and bilateral small pleural   effusions         Electronically signed by Poly Luna      XR CHEST PORTABLE   Final Result   1. Interval development of small bilateral pleural effusions right greater than   left. Follow-up to resolution is suggested.      Electronically signed by KOLE SHEEHAN          ED Course as of 01/08/25 1630   Wed Jan 08, 2025   1223 EKG shows atrial fibrillation, 55 bpm, no acute ischemic changes, no STEMI [SL]   1247 Patient has a known history of A-fib and is on Xarelto [SL]   1347 Sodium: 139 [SL]   1347 Potassium: 4.1 [SL]   1347 Creatinine(!): 1.31 [SL]   1347 Hemoglobin Quant: 13.4 [SL]   1347 SARS-CoV-2, PCR: Not detected [SL]   1347 Rapid Influenza A By PCR: Not detected [SL]   1347 Rapid Influenza B By PCR: Not  detected [SL]   1347 Troponin, High Sensitivity: 21 [SL]   1347 Magnesium: 1.9 [SL]   1347 NT Pro-BNP(!): 2,464 [SL]   1347 XR CHEST PORTABLE   Interval development of small bilateral pleural effusions right greater than  left.   [SL]   1456 Awaiting CTA [SL]   1456 CTA ordered due to D-dimer machine malfunction, D-dimer unavailable for restratification, given patient's leg swelling, hemoptysis, shortness of breath and new evidence of heart failure concern for PE [SL]   1554 CTA:  No pulmonary embolism  Incidental cardiomegaly, coronary artery disease, and bilateral small pleural  effusions   [JR]      ED Course User Index  [JR] Maciel Porter DO  [SL] Kyraa Mendez MD       Diagnosis:   1. Elevated brain natriuretic peptide (BNP) level    2. Shortness of breath    3. Leg swelling        Disposition:   Stanley 01/08/2025 04:19:41 PM    Plan:   CT returned showing no evidence for pulm embolus, incidental cardiomegaly, coronary disease, bilateral small pleural effusions.  These fines were discussed with the patient at bedside.  I did rediscuss that with patient's elevated BNP of 2400 no previous history of heart failure I did recommend admission.  Patient was not in agreements with admission.  Patient has capacity to make decisions.  Risk and benefits of admission versus leaving AMA from the ER were discussed with the patient.  Patient understood.  Patient signed AMA form.  Patient was advised if changes mind that he may return to the emergency department.    DO German Weber Junaid, DO  01/08/25 8840

## 2025-01-08 NOTE — ED NOTES
Patient prefers to sit in a chair in the room and is not connected so that he may walk to and from the bath room as he pleases. Patient did received Lasix. Patient has a very steady gait and uses no devices to help him walk.

## 2025-01-08 NOTE — ED TRIAGE NOTES
PT PRESENTS TO ED C/O BILATERAL LEG SWELLING AND SHORTNESS OF BREATH. PT ALSO REPORTS 20LB WEIGHT GAIN OVER 2 WEEKS. PT REPORTS HE WAS SPEAKING TO \"AN ER FRIEND\" WHO ADVISED HIM TO SEEK CARE IN THE ED.

## 2025-01-08 NOTE — ED NOTES
Followed up with David Fregoso on 1/8/2025 at 4:26 PM. Patient left the ED with a disposition of AMA on . Patient cited personal obligations as reason. Advised patient to follow up with a primary care physician or return to the Emergency Department if symptoms worsen.   Anisha Babb RN

## 2025-01-15 NOTE — ED PROVIDER NOTES
[facility] EMERGENCY DEPARTMENT  EMERGENCY DEPARTMENT ENCOUNTER      Pt Name: David Fregoso  MRN: [id number]  Birthdate 12/22/1932  Date of evaluation: 1/8/2025  Provider: Kyara Mendez MD    CHIEF COMPLAINT       Chief Complaint   Patient presents with    Leg Swelling    Shortness of Breath         HISTORY OF PRESENT ILLNESS   (Location/Symptom, Timing/Onset, Context/Setting, Quality, Duration, Modifying Factors, Severity)  Note limiting factors.   HPI  89-year-old male with history of A-fib presenting for leg swelling and shortness of breath.  Patient reports over the last 2 weeks he has had a 20 pound weight gain and is noticed his legs are more swollen.  Reports no significant pain in his legs no redness or skin changes.  Reports that he has had no chest pain.  Reports shortness of breath is worse with activity.  Reports no fevers or chills, reports no rhinorrhea increased mucus production.    Review of External Medical Records:         Nursing Notes were reviewed.      REVIEW OF SYSTEMS    (2-9 systems for level 4, 10 or more for level 5)     Except as noted above the remainder of the review of systems was reviewed and negative.       PAST MEDICAL HISTORY     Past Medical History:   Diagnosis Date    Atrial fibrillation (HCC)     DVT (deep venous thrombosis) (HCC)          SURGICAL HISTORY       Past Surgical History:   Procedure Laterality Date    CHOLECYSTECTOMY      COLONOSCOPY N/A 7/24/2023    COLONOSCOPY POLYPECTOMY SNARE/COLD BIOPSY performed by Vincent Shepherd MD at [facility] ENDOSCOPY    CT BIOPSY DEEP BONE PERCUTANEOUS  3/9/2020    CT BIOPSY PERCUTANEOUS DEEP BONE 3/9/2020    TONSILLECTOMY AND ADENOIDECTOMY      UPPER GASTROINTESTINAL ENDOSCOPY N/A 7/24/2023    EGD ESOPHAGOGASTRODUODENOSCOPY, COLONOSCOPY performed by Vincent Shepherd MD at [facility] ENDOSCOPY    UPPER GASTROINTESTINAL ENDOSCOPY N/A 7/24/2023    EGD BIOPSY performed by Vincent Shepherd MD at [facility] ENDOSCOPY         CURRENT

## 2025-01-31 DIAGNOSIS — Z12.5 SCREENING FOR PROSTATE CANCER: ICD-10-CM

## 2025-01-31 LAB — PSA SERPL-MCNC: 1.7 NG/ML (ref 0.01–4)

## 2025-02-04 ENCOUNTER — TELEPHONE (OUTPATIENT)
Dept: PRIMARY CARE CLINIC | Facility: CLINIC | Age: 89
End: 2025-02-04

## 2025-02-04 ENCOUNTER — OFFICE VISIT (OUTPATIENT)
Dept: PRIMARY CARE CLINIC | Facility: CLINIC | Age: 89
End: 2025-02-04
Payer: MEDICARE

## 2025-02-04 ENCOUNTER — HOSPITAL ENCOUNTER (OUTPATIENT)
Facility: HOSPITAL | Age: 89
Discharge: HOME | End: 2025-02-07
Payer: MEDICARE

## 2025-02-04 VITALS
HEIGHT: 70 IN | DIASTOLIC BLOOD PRESSURE: 67 MMHG | SYSTOLIC BLOOD PRESSURE: 126 MMHG | BODY MASS INDEX: 25.08 KG/M2 | OXYGEN SATURATION: 100 % | RESPIRATION RATE: 18 BRPM | TEMPERATURE: 97.8 F | HEART RATE: 54 BPM | WEIGHT: 175.2 LBS

## 2025-02-04 DIAGNOSIS — R06.02 SHORTNESS OF BREATH: ICD-10-CM

## 2025-02-04 DIAGNOSIS — D72.819 LEUKOPENIA, UNSPECIFIED TYPE: ICD-10-CM

## 2025-02-04 DIAGNOSIS — R79.89 ELEVATED BRAIN NATRIURETIC PEPTIDE (BNP) LEVEL: Primary | ICD-10-CM

## 2025-02-04 DIAGNOSIS — R17 ELEVATED BILIRUBIN: ICD-10-CM

## 2025-02-04 DIAGNOSIS — I48.91 ATRIAL FIBRILLATION, UNSPECIFIED TYPE (HCC): ICD-10-CM

## 2025-02-04 DIAGNOSIS — N18.31 CHRONIC KIDNEY DISEASE, STAGE 3A (HCC): ICD-10-CM

## 2025-02-04 DIAGNOSIS — J90 PLEURAL EFFUSION: ICD-10-CM

## 2025-02-04 DIAGNOSIS — R60.0 LOCALIZED EDEMA: ICD-10-CM

## 2025-02-04 DIAGNOSIS — R79.89 ELEVATED BRAIN NATRIURETIC PEPTIDE (BNP) LEVEL: ICD-10-CM

## 2025-02-04 PROCEDURE — 1036F TOBACCO NON-USER: CPT | Performed by: FAMILY MEDICINE

## 2025-02-04 PROCEDURE — 1123F ACP DISCUSS/DSCN MKR DOCD: CPT | Performed by: FAMILY MEDICINE

## 2025-02-04 PROCEDURE — G8419 CALC BMI OUT NRM PARAM NOF/U: HCPCS | Performed by: FAMILY MEDICINE

## 2025-02-04 PROCEDURE — 99214 OFFICE O/P EST MOD 30 MIN: CPT | Performed by: FAMILY MEDICINE

## 2025-02-04 PROCEDURE — 1126F AMNT PAIN NOTED NONE PRSNT: CPT | Performed by: FAMILY MEDICINE

## 2025-02-04 PROCEDURE — 71046 X-RAY EXAM CHEST 2 VIEWS: CPT

## 2025-02-04 PROCEDURE — 1160F RVW MEDS BY RX/DR IN RCRD: CPT | Performed by: FAMILY MEDICINE

## 2025-02-04 PROCEDURE — G8427 DOCREV CUR MEDS BY ELIG CLIN: HCPCS | Performed by: FAMILY MEDICINE

## 2025-02-04 PROCEDURE — 1159F MED LIST DOCD IN RCRD: CPT | Performed by: FAMILY MEDICINE

## 2025-02-04 SDOH — ECONOMIC STABILITY: FOOD INSECURITY: WITHIN THE PAST 12 MONTHS, YOU WORRIED THAT YOUR FOOD WOULD RUN OUT BEFORE YOU GOT MONEY TO BUY MORE.: PATIENT DECLINED

## 2025-02-04 SDOH — ECONOMIC STABILITY: FOOD INSECURITY: WITHIN THE PAST 12 MONTHS, THE FOOD YOU BOUGHT JUST DIDN'T LAST AND YOU DIDN'T HAVE MONEY TO GET MORE.: PATIENT DECLINED

## 2025-02-04 ASSESSMENT — PATIENT HEALTH QUESTIONNAIRE - PHQ9
SUM OF ALL RESPONSES TO PHQ QUESTIONS 1-9: 0
2. FEELING DOWN, DEPRESSED OR HOPELESS: NOT AT ALL
SUM OF ALL RESPONSES TO PHQ QUESTIONS 1-9: 0
1. LITTLE INTEREST OR PLEASURE IN DOING THINGS: NOT AT ALL
SUM OF ALL RESPONSES TO PHQ QUESTIONS 1-9: 0
SUM OF ALL RESPONSES TO PHQ9 QUESTIONS 1 & 2: 0
SUM OF ALL RESPONSES TO PHQ QUESTIONS 1-9: 0

## 2025-02-04 NOTE — PROGRESS NOTES
Health Decision Maker has been checked with the patient   Primary Decision Maker: Audra Fregoso - Spouse - [phone]    Secondary Decision Maker: Shaye Fregoso - Child - [phone]     AI form was signed    Chief Complaint   Patient presents with    Follow-up     Er follow up       \"Have you been to the ER, urgent care clinic since your last visit?  Hospitalized since your last visit?\"    YES - When: approximately 1 months ago.  Where and Why: ER.    “Have you seen or consulted any other health care providers outside of [facility] since your last visit?”    NO      Vitals:    02/04/25 0939   Weight: 79.5 kg (175 lb 3.2 oz)   Height: 1.778 m (5' 10\")      Depression: Not at risk (2/4/2025)    PHQ-2     PHQ-2 Score: 0              Click Here for Release of Records Request    Chart reviewed: immunizations are documented.     There is no immunization history on file for this patient.

## 2025-02-04 NOTE — PROGRESS NOTES
HPI     Chief Complaint   Patient presents with    Follow-up     Er follow up     He is a 89 y.o. male who presents for ER follow up.    Patient had increased shortness of breath and leg swelling that prompted him to go to ER 1/8/25. He had an elevated pro BNP and they recommended he be admitted for an echo and cardiac eval. He was given diuresis in ER. He states he had to go home because of work commitments and he left AMA. He states he had frequent urination for next 4-5 days but since then has been doing much better. He denies chest pain or shortness of breath and states his legs are now much better. He states he did reach out to his Cardiologist but never heard from them and would like a referral to a new Cardiologist. He is still taking his AC for his hx of A Fib.     He has known thrombocytopenia but in ER he also had leukopenia. This is new for him.    His bili has been elevated and appears his baseline is 1.2-1.7. He had a repeat scan that showed a 1cm hypodensity in the R hepatic lobe of his liver. He had a CT Abd in 2022 that showed a 7mm hypodensity in liver at the time. He is not jaundiced. He is under the care of Heme for his thrombocytopenia.     CXR in ER showed pleural effusions they recommend a repeat CXR to ensure resolution.     He states overall he is back to baseline now.     Reviewed PmHx, RxHx, FmHx, SocHx, AllgHx and updated and dated in the chart.    Physical Exam:  /67   Pulse 54   Temp 97.8 °F (36.6 °C) (Oral)   Resp 18   Ht 1.778 m (5' 10\")   Wt 79.5 kg (175 lb 3.2 oz)   SpO2 100%   BMI 25.14 kg/m²   Physical Exam  Vitals and nursing note reviewed.   Constitutional:       General: He is not in acute distress.     Appearance: Normal appearance. He is not ill-appearing.   Cardiovascular:      Rate and Rhythm: Regular rhythm. Bradycardia present.      Heart sounds: No murmur heard.  Pulmonary:      Effort: Pulmonary effort is normal. No respiratory distress.      Breath

## 2025-02-05 ENCOUNTER — TELEPHONE (OUTPATIENT)
Dept: PRIMARY CARE CLINIC | Facility: CLINIC | Age: 89
End: 2025-02-05

## 2025-02-05 DIAGNOSIS — R17 ELEVATED BILIRUBIN: Primary | ICD-10-CM

## 2025-02-05 LAB
ANION GAP SERPL CALC-SCNC: 9 MMOL/L (ref 2–12)
BASOPHILS # BLD: 0.01 K/UL (ref 0–0.1)
BASOPHILS NFR BLD: 0.2 % (ref 0–1)
BILIRUB DIRECT SERPL-MCNC: 0.5 MG/DL (ref 0–0.2)
BILIRUB INDIRECT SERPL-MCNC: 1 MG/DL (ref 0–1.1)
BILIRUB SERPL-MCNC: 1.5 MG/DL (ref 0.2–1)
BUN SERPL-MCNC: 20 MG/DL (ref 6–20)
BUN/CREAT SERPL: 14 (ref 12–20)
CALCIUM SERPL-MCNC: 9.3 MG/DL (ref 8.5–10.1)
CHLORIDE SERPL-SCNC: 103 MMOL/L (ref 97–108)
CO2 SERPL-SCNC: 28 MMOL/L (ref 21–32)
CREAT SERPL-MCNC: 1.44 MG/DL (ref 0.7–1.3)
DIFFERENTIAL METHOD BLD: ABNORMAL
EOSINOPHIL # BLD: 0.07 K/UL (ref 0–0.4)
EOSINOPHIL NFR BLD: 1.7 % (ref 0–7)
ERYTHROCYTE [DISTWIDTH] IN BLOOD BY AUTOMATED COUNT: 13.9 % (ref 11.5–14.5)
GLUCOSE SERPL-MCNC: 88 MG/DL (ref 65–100)
HCT VFR BLD AUTO: 42.5 % (ref 36.6–50.3)
HGB BLD-MCNC: 13.8 G/DL (ref 12.1–17)
IMM GRANULOCYTES # BLD AUTO: 0.01 K/UL (ref 0–0.04)
IMM GRANULOCYTES NFR BLD AUTO: 0.2 % (ref 0–0.5)
LYMPHOCYTES # BLD: 0.6 K/UL (ref 0.8–3.5)
LYMPHOCYTES NFR BLD: 14.2 % (ref 12–49)
MCH RBC QN AUTO: 33.3 PG (ref 26–34)
MCHC RBC AUTO-ENTMCNC: 32.5 G/DL (ref 30–36.5)
MCV RBC AUTO: 102.4 FL (ref 80–99)
MONOCYTES # BLD: 0.26 K/UL (ref 0–1)
MONOCYTES NFR BLD: 6.2 % (ref 5–13)
NEUTS SEG # BLD: 3.26 K/UL (ref 1.8–8)
NEUTS SEG NFR BLD: 77.5 % (ref 32–75)
NRBC # BLD: 0 K/UL (ref 0–0.01)
NRBC BLD-RTO: 0 PER 100 WBC
NT PRO BNP: 1701 PG/ML
PLATELET # BLD AUTO: 76 K/UL (ref 150–400)
POTASSIUM SERPL-SCNC: 4.2 MMOL/L (ref 3.5–5.1)
RBC # BLD AUTO: 4.15 M/UL (ref 4.1–5.7)
RBC MORPH BLD: ABNORMAL
SODIUM SERPL-SCNC: 140 MMOL/L (ref 136–145)
WBC # BLD AUTO: 4.2 K/UL (ref 4.1–11.1)

## 2025-02-05 NOTE — TELEPHONE ENCOUNTER
----- Message from Dr. Yulissa Rivera, DO sent at 2/5/2025  7:46 AM EST -----  Please CALL with results if patient has not seen [employer] message.

## 2025-02-07 ENCOUNTER — APPOINTMENT (OUTPATIENT)
Facility: HOSPITAL | Age: 89
End: 2025-02-07
Payer: MEDICARE

## 2025-02-07 ENCOUNTER — HOSPITAL ENCOUNTER (EMERGENCY)
Facility: HOSPITAL | Age: 89
Discharge: HOME | End: 2025-02-07
Attending: EMERGENCY MEDICINE
Payer: MEDICARE

## 2025-02-07 VITALS
BODY MASS INDEX: 25.62 KG/M2 | DIASTOLIC BLOOD PRESSURE: 74 MMHG | HEART RATE: 53 BPM | WEIGHT: 178.57 LBS | OXYGEN SATURATION: 95 % | RESPIRATION RATE: 16 BRPM | SYSTOLIC BLOOD PRESSURE: 136 MMHG | TEMPERATURE: 97.5 F

## 2025-02-07 DIAGNOSIS — S90.32XA CONTUSION OF LEFT FOOT, INITIAL ENCOUNTER: Primary | ICD-10-CM

## 2025-02-07 PROCEDURE — 73630 X-RAY EXAM OF FOOT: CPT

## 2025-02-07 PROCEDURE — 99283 EMERGENCY DEPT VISIT LOW MDM: CPT

## 2025-02-07 PROCEDURE — 6370000000 HC RX 637 (ALT 250 FOR IP): Performed by: EMERGENCY MEDICINE

## 2025-02-07 RX ORDER — ACETAMINOPHEN 500 MG
1000 TABLET ORAL
Status: COMPLETED | OUTPATIENT
Start: 2025-02-07 | End: 2025-02-07

## 2025-02-07 RX ADMIN — ACETAMINOPHEN 1000 MG: 500 TABLET ORAL at 09:51

## 2025-02-07 NOTE — ED PROVIDER NOTES
[facility]  EMERGENCY DEPARTMENT ENCOUNTER      Pt Name: David Fregoso  MRN: [id number]  Birthdate 12/22/1932  Date of evaluation: 2/7/2025  Provider: Yaw Linda MD    CHIEF COMPLAINT       Chief Complaint   Patient presents with    Foot Injury         HISTORY OF PRESENT ILLNESS   (Location/Symptom, Timing/Onset, Context/Setting, Quality, Duration, Modifying Factors, Severity)  Note limiting factors.   89-year-old with a history of A-fib, DVT, bradycardia, thrombocytopenia.  He presents accompanied by his wife with complaints of left foot pain.  He states that he accidentally dropped a vice on the foot 2 days ago and then a 2 x 4 on it yesterday.  The pain is moderate and worse with standing/ambulation.  He is limping.  There is bruising and swelling.  No other injuries or complaints.  No treatment prior to arrival.          Review of External Medical Records:     Nursing Notes were reviewed.    REVIEW OF SYSTEMS    (2-9 systems for level 4, 10 or more for level 5)     Review of Systems    Except as noted above the remainder of the review of systems was reviewed and negative.       PAST MEDICAL HISTORY     Past Medical History:   Diagnosis Date    Atrial fibrillation (HCC)     DVT (deep venous thrombosis) (HCC)          SURGICAL HISTORY       Past Surgical History:   Procedure Laterality Date    CHOLECYSTECTOMY      COLONOSCOPY N/A 7/24/2023    COLONOSCOPY POLYPECTOMY SNARE/COLD BIOPSY performed by Vincent Shepherd MD at [facility] ENDOSCOPY    CT BIOPSY DEEP BONE PERCUTANEOUS  3/9/2020    CT BIOPSY PERCUTANEOUS DEEP BONE 3/9/2020    TONSILLECTOMY AND ADENOIDECTOMY      UPPER GASTROINTESTINAL ENDOSCOPY N/A 7/24/2023    EGD ESOPHAGOGASTRODUODENOSCOPY, COLONOSCOPY performed by Vincent Shepherd MD at [facility] ENDOSCOPY    UPPER GASTROINTESTINAL ENDOSCOPY N/A 7/24/2023    EGD BIOPSY performed by Vincent Shepherd MD at [facility] ENDOSCOPY         CURRENT MEDICATIONS       Previous Medications

## 2025-02-07 NOTE — ED TRIAGE NOTES
Patient dropped a vice and 2x4 on the left foot, swollen and bruised left foot, limp present, pain present

## 2025-03-19 ENCOUNTER — HOSPITAL ENCOUNTER (EMERGENCY)
Facility: HOSPITAL | Age: 89
Discharge: HOME | End: 2025-03-19
Attending: EMERGENCY MEDICINE
Payer: MEDICARE

## 2025-03-19 ENCOUNTER — APPOINTMENT (OUTPATIENT)
Facility: HOSPITAL | Age: 89
End: 2025-03-19
Payer: MEDICARE

## 2025-03-19 ENCOUNTER — APPOINTMENT (OUTPATIENT)
Facility: HOSPITAL | Age: 89
End: 2025-03-19
Attending: EMERGENCY MEDICINE
Payer: MEDICARE

## 2025-03-19 VITALS
TEMPERATURE: 98.6 F | HEIGHT: 70 IN | OXYGEN SATURATION: 97 % | SYSTOLIC BLOOD PRESSURE: 129 MMHG | HEART RATE: 51 BPM | BODY MASS INDEX: 24.97 KG/M2 | DIASTOLIC BLOOD PRESSURE: 68 MMHG | RESPIRATION RATE: 18 BRPM | WEIGHT: 174.38 LBS

## 2025-03-19 DIAGNOSIS — M71.21 SYNOVIAL CYST OF RIGHT POPLITEAL SPACE: Primary | ICD-10-CM

## 2025-03-19 LAB
ALBUMIN SERPL-MCNC: 3.1 G/DL (ref 3.5–5)
ALBUMIN/GLOB SERPL: 0.8 (ref 1.1–2.2)
ALP SERPL-CCNC: 67 U/L (ref 45–117)
ALT SERPL-CCNC: 13 U/L (ref 12–78)
ANION GAP SERPL CALC-SCNC: 6 MMOL/L (ref 2–12)
AST SERPL-CCNC: 13 U/L (ref 15–37)
BASOPHILS # BLD: 0.01 K/UL (ref 0–0.1)
BASOPHILS NFR BLD: 0.2 % (ref 0–1)
BILIRUB SERPL-MCNC: 1.2 MG/DL (ref 0.2–1)
BUN SERPL-MCNC: 25 MG/DL (ref 6–20)
BUN/CREAT SERPL: 15 (ref 12–20)
CALCIUM SERPL-MCNC: 9 MG/DL (ref 8.5–10.1)
CHLORIDE SERPL-SCNC: 104 MMOL/L (ref 97–108)
CO2 SERPL-SCNC: 32 MMOL/L (ref 21–32)
CREAT SERPL-MCNC: 1.66 MG/DL (ref 0.7–1.3)
DIFFERENTIAL METHOD BLD: ABNORMAL
ECHO BSA: 1.98 M2
EKG DIAGNOSIS: NORMAL
EKG Q-T INTERVAL: 450 MS
EKG QRS DURATION: 94 MS
EKG QTC CALCULATION (BAZETT): 438 MS
EKG R AXIS: 7 DEGREES
EKG T AXIS: 53 DEGREES
EKG VENTRICULAR RATE: 57 BPM
EOSINOPHIL # BLD: 0.08 K/UL (ref 0–0.4)
EOSINOPHIL NFR BLD: 1.4 % (ref 0–7)
ERYTHROCYTE [DISTWIDTH] IN BLOOD BY AUTOMATED COUNT: 12.8 % (ref 11.5–14.5)
GLOBULIN SER CALC-MCNC: 3.9 G/DL (ref 2–4)
GLUCOSE SERPL-MCNC: 101 MG/DL (ref 65–100)
HCT VFR BLD AUTO: 39.6 % (ref 36.6–50.3)
HGB BLD-MCNC: 13 G/DL (ref 12.1–17)
IMM GRANULOCYTES # BLD AUTO: 0.02 K/UL (ref 0–0.04)
IMM GRANULOCYTES NFR BLD AUTO: 0.4 % (ref 0–0.5)
LYMPHOCYTES # BLD: 0.86 K/UL (ref 0.8–3.5)
LYMPHOCYTES NFR BLD: 15.2 % (ref 12–49)
MAGNESIUM SERPL-MCNC: 1.9 MG/DL (ref 1.6–2.4)
MCH RBC QN AUTO: 32.5 PG (ref 26–34)
MCHC RBC AUTO-ENTMCNC: 32.8 G/DL (ref 30–36.5)
MCV RBC AUTO: 99 FL (ref 80–99)
MONOCYTES # BLD: 0.54 K/UL (ref 0–1)
MONOCYTES NFR BLD: 9.6 % (ref 5–13)
NEUTS SEG # BLD: 4.13 K/UL (ref 1.8–8)
NEUTS SEG NFR BLD: 73.2 % (ref 32–75)
NRBC # BLD: 0 K/UL (ref 0–0.01)
NRBC BLD-RTO: 0 PER 100 WBC
NT PRO BNP: 2177 PG/ML (ref 0–450)
PLATELET # BLD AUTO: 112 K/UL (ref 150–400)
PMV BLD AUTO: 11.7 FL (ref 8.9–12.9)
POTASSIUM SERPL-SCNC: 4.2 MMOL/L (ref 3.5–5.1)
PROT SERPL-MCNC: 7 G/DL (ref 6.4–8.2)
RBC # BLD AUTO: 4 M/UL (ref 4.1–5.7)
SODIUM SERPL-SCNC: 142 MMOL/L (ref 136–145)
TROPONIN I SERPL HS-MCNC: 17 NG/L (ref 0–76)
WBC # BLD AUTO: 5.6 K/UL (ref 4.1–11.1)

## 2025-03-19 PROCEDURE — 71045 X-RAY EXAM CHEST 1 VIEW: CPT

## 2025-03-19 PROCEDURE — 99285 EMERGENCY DEPT VISIT HI MDM: CPT

## 2025-03-19 PROCEDURE — 83735 ASSAY OF MAGNESIUM: CPT

## 2025-03-19 PROCEDURE — 85025 COMPLETE CBC W/AUTO DIFF WBC: CPT

## 2025-03-19 PROCEDURE — 93005 ELECTROCARDIOGRAM TRACING: CPT | Performed by: EMERGENCY MEDICINE

## 2025-03-19 PROCEDURE — 93971 EXTREMITY STUDY: CPT

## 2025-03-19 PROCEDURE — 84484 ASSAY OF TROPONIN QUANT: CPT

## 2025-03-19 PROCEDURE — 73562 X-RAY EXAM OF KNEE 3: CPT

## 2025-03-19 PROCEDURE — 80053 COMPREHEN METABOLIC PANEL: CPT

## 2025-03-19 PROCEDURE — 83880 ASSAY OF NATRIURETIC PEPTIDE: CPT

## 2025-03-19 PROCEDURE — 93010 ELECTROCARDIOGRAM REPORT: CPT | Performed by: INTERNAL MEDICINE

## 2025-03-19 RX ORDER — POTASSIUM CHLORIDE 750 MG/1
10 CAPSULE, EXTENDED RELEASE ORAL DAILY
COMMUNITY
Start: 2025-01-10

## 2025-03-19 RX ORDER — ZINC GLUCONATE 50 MG
50 TABLET ORAL DAILY
COMMUNITY

## 2025-03-19 RX ORDER — VITAMIN B COMPLEX
1 CAPSULE ORAL DAILY
COMMUNITY

## 2025-03-19 RX ORDER — CHLORAL HYDRATE 500 MG
1 CAPSULE ORAL DAILY
COMMUNITY

## 2025-03-19 RX ORDER — FUROSEMIDE 20 MG/1
20 TABLET ORAL DAILY
COMMUNITY
Start: 2025-01-10

## 2025-03-19 ASSESSMENT — LIFESTYLE VARIABLES
HOW MANY STANDARD DRINKS CONTAINING ALCOHOL DO YOU HAVE ON A TYPICAL DAY: PATIENT DOES NOT DRINK
HOW OFTEN DO YOU HAVE A DRINK CONTAINING ALCOHOL: NEVER

## 2025-03-19 ASSESSMENT — PAIN SCALES - GENERAL: PAINLEVEL_OUTOF10: 7

## 2025-03-19 NOTE — ED NOTES
Patient left ED in no acute distress, alert and oriented x4. Patient was encourage to come back if symptoms get worse. Patient was provided with discharge instructions and prescriptions. All questions were answered.

## 2025-03-19 NOTE — ED TRIAGE NOTES
Patient presents to ED ambulatory c/o right leg swelling states it makes it difficult to walk, x2 weeks, states he took lasix 20 mg and potassium 10 meq this am and describes pain as sharp

## 2025-03-19 NOTE — DISCHARGE INSTRUCTIONS
Routine appointments for health maintenance with a primary care provider are very important and emergency department visits are no substitute.  You should review all findings and test results from your visit today with your primary care physician.        We recommended that you take medications as prescribed.    Please try to rest, use ice, compression and elevation to help with symptoms.    Use ice every 2 hours for 20 minutes to help alleviate inflammation and pain.    You may use 800 mg of ibuprofen every 6 hours as needed for pain or fever.  You should NOT take this medication if you're taking other NSAID/anti-inflammatory medications or on steroids or other blood thinning medications.    Return to the emergency department for any new or concerning signs/symptoms or failure to improve.

## 2025-04-16 ENCOUNTER — HOSPITAL ENCOUNTER (EMERGENCY)
Facility: HOSPITAL | Age: 89
Discharge: HOME | End: 2025-04-16
Attending: EMERGENCY MEDICINE
Payer: MEDICARE

## 2025-04-16 VITALS
RESPIRATION RATE: 18 BRPM | SYSTOLIC BLOOD PRESSURE: 149 MMHG | HEART RATE: 58 BPM | OXYGEN SATURATION: 97 % | DIASTOLIC BLOOD PRESSURE: 66 MMHG | TEMPERATURE: 97.7 F

## 2025-04-16 DIAGNOSIS — S61.412A LACERATION OF LEFT HAND, FOREIGN BODY PRESENCE UNSPECIFIED, INITIAL ENCOUNTER: ICD-10-CM

## 2025-04-16 DIAGNOSIS — Z23 NEED FOR TETANUS BOOSTER: Primary | ICD-10-CM

## 2025-04-16 PROCEDURE — 90714 TD VACC NO PRESV 7 YRS+ IM: CPT | Performed by: EMERGENCY MEDICINE

## 2025-04-16 PROCEDURE — 6360000002 HC RX W HCPCS: Performed by: EMERGENCY MEDICINE

## 2025-04-16 PROCEDURE — 99284 EMERGENCY DEPT VISIT MOD MDM: CPT

## 2025-04-16 PROCEDURE — 90471 IMMUNIZATION ADMIN: CPT | Performed by: EMERGENCY MEDICINE

## 2025-04-16 RX ORDER — CEPHALEXIN 500 MG/1
500 CAPSULE ORAL 2 TIMES DAILY
Qty: 10 CAPSULE | Refills: 0 | Status: SHIPPED | OUTPATIENT
Start: 2025-04-16 | End: 2025-04-21

## 2025-04-16 RX ADMIN — CLOSTRIDIUM TETANI TOXOID ANTIGEN (FORMALDEHYDE INACTIVATED) AND CORYNEBACTERIUM DIPHTHERIAE TOXOID ANTIGEN (FORMALDEHYDE INACTIVATED) 0.5 ML: 5; 2 INJECTION, SUSPENSION INTRAMUSCULAR at 21:02

## 2025-04-16 ASSESSMENT — PAIN DESCRIPTION - LOCATION: LOCATION: HAND

## 2025-04-16 ASSESSMENT — PAIN SCALES - GENERAL: PAINLEVEL_OUTOF10: 2

## 2025-04-16 ASSESSMENT — PAIN DESCRIPTION - ORIENTATION: ORIENTATION: LEFT

## 2025-04-16 NOTE — ED TRIAGE NOTES
Patient arrives with cc of laceration to the L hand. Patient reports he breaking down a plastic box when it cut his hand. Reports cleaning area and applying gauze. Removed gauze and bleeding began again. Patient is on blood thinners.

## 2025-04-17 NOTE — ED PROVIDER NOTES
[facility]  EMERGENCY DEPARTMENT ENCOUNTER      Pt Name: David Fregoso  MRN: [id number]  Birthdate 12/22/1932  Date of evaluation: 4/16/2025  Provider: Farhana Syed DO    CHIEF COMPLAINT       Chief Complaint   Patient presents with    Laceration         HISTORY OF PRESENT ILLNESS   (Location/Symptom, Timing/Onset, Context/Setting, Quality, Duration, Modifying Factors, Severity)  Note limiting factors.   HPI      Review of External Medical Records:     Nursing Notes were reviewed.    REVIEW OF SYSTEMS    (2-9 systems for level 4, 10 or more for level 5)     Review of Systems    Except as noted above the remainder of the review of systems was reviewed and negative.       PAST MEDICAL HISTORY     Past Medical History:   Diagnosis Date    Atrial fibrillation (HCC)     DVT (deep venous thrombosis) (HCC)          SURGICAL HISTORY       Past Surgical History:   Procedure Laterality Date    CHOLECYSTECTOMY      COLONOSCOPY N/A 7/24/2023    COLONOSCOPY POLYPECTOMY SNARE/COLD BIOPSY performed by Vincent Shepherd MD at [facility] ENDOSCOPY    CT BIOPSY DEEP BONE PERCUTANEOUS  3/9/2020    CT BIOPSY PERCUTANEOUS DEEP BONE 3/9/2020    TONSILLECTOMY AND ADENOIDECTOMY      UPPER GASTROINTESTINAL ENDOSCOPY N/A 7/24/2023    EGD ESOPHAGOGASTRODUODENOSCOPY, COLONOSCOPY performed by Vincent Shepherd MD at [facility] ENDOSCOPY    UPPER GASTROINTESTINAL ENDOSCOPY N/A 7/24/2023    EGD BIOPSY performed by Vincent Shepherd MD at [facility] ENDOSCOPY         CURRENT MEDICATIONS       Previous Medications    ASCORBIC ACID (VITAMIN C) 1000 MG TABLET    Take 1 tablet by mouth daily    B COMPLEX VITAMINS CAPSULE    Take 1 capsule by mouth daily    CO-ENZYME Q-10 30 MG CAPSULE    Take 100 mg by mouth    FUROSEMIDE (LASIX) 20 MG TABLET    Take 1 tablet by mouth daily    OMEGA-3 FATTY ACIDS (FISH OIL) 1000 MG CAPSULE    Take 1 capsule by mouth daily    POTASSIUM CHLORIDE (MICRO-K) 10 MEQ EXTENDED RELEASE CAPSULE    Take 1

## 2025-05-07 ENCOUNTER — OFFICE VISIT (OUTPATIENT)
Age: 89
End: 2025-05-07
Payer: MEDICARE

## 2025-05-07 VITALS
OXYGEN SATURATION: 97 % | HEART RATE: 56 BPM | DIASTOLIC BLOOD PRESSURE: 70 MMHG | HEIGHT: 70 IN | SYSTOLIC BLOOD PRESSURE: 130 MMHG | WEIGHT: 178 LBS | RESPIRATION RATE: 16 BRPM | BODY MASS INDEX: 25.48 KG/M2

## 2025-05-07 DIAGNOSIS — I48.20 ATRIAL FIBRILLATION, CHRONIC (HCC): ICD-10-CM

## 2025-05-07 DIAGNOSIS — R00.1 BRADYCARDIA: ICD-10-CM

## 2025-05-07 DIAGNOSIS — I50.9 ACUTE ON CHRONIC CONGESTIVE HEART FAILURE, UNSPECIFIED HEART FAILURE TYPE (HCC): Primary | ICD-10-CM

## 2025-05-07 DIAGNOSIS — R60.0 LOWER EXTREMITY EDEMA: ICD-10-CM

## 2025-05-07 DIAGNOSIS — Z79.01: ICD-10-CM

## 2025-05-07 PROCEDURE — 99214 OFFICE O/P EST MOD 30 MIN: CPT | Performed by: SPECIALIST

## 2025-05-07 PROCEDURE — 93005 ELECTROCARDIOGRAM TRACING: CPT | Performed by: SPECIALIST

## 2025-05-07 ASSESSMENT — PATIENT HEALTH QUESTIONNAIRE - PHQ9
SUM OF ALL RESPONSES TO PHQ QUESTIONS 1-9: 0
1. LITTLE INTEREST OR PLEASURE IN DOING THINGS: NOT AT ALL
SUM OF ALL RESPONSES TO PHQ QUESTIONS 1-9: 0
2. FEELING DOWN, DEPRESSED OR HOPELESS: NOT AT ALL

## 2025-05-07 NOTE — PATIENT INSTRUCTIONS
You have been scheduled for an echo at [facility] tomorrow.    We will see you back in 4-6 weeks with Dr. Ya.    You will see Dr. Arnold to discuss Watchman.

## 2025-05-07 NOTE — PROGRESS NOTES
1. Have you been to the ER, urgent care clinic since your last visit?  Hospitalized since your last visit?No    2. Have you seen or consulted any other health care providers outside of the [facility] since your last visit?  Include any pap smears or colon screening. No    
and/or infarction.  2. LVEF equals 64%. Left ventricular wall motion and thickening is normal.    Signed by: Karl Miner MD on 9/2/2022  2:34 PM, Signed by: Unknown Provider Result on 9/2/2022 12:00 AM     Future Appointments   Date Time Provider Department Center   6/3/2025  9:00 AM Jose L Ya MD [facility]   7/28/2025  9:00 AM Brian Arnold MD [facility]      Patient Care Team:  Yulissa Rivera DO as PCP - General  Yulissa Rivera DO as PCP - Empaneled Provider   ROS-except as noted above.. A complete cardiac and respiratory are reviewed and negative except as above ; Resp-denies wheezing  or productive cough,. Const- No unusual weight loss or fever; Neuro-no recent seizure or CVA ; GI- No BRBPR, abdom pain, bloating ; - no  hematuria   see supplement sheet, initialed and to be scanned by staff    Past Medical History:   Diagnosis Date    Atrial fibrillation (HCC)     DVT (deep venous thrombosis) (HCC)       Social Hx= reports that he has never smoked. He has never used smokeless tobacco. He reports that he does not drink alcohol and does not use drugs.   Family Hx- family history is not on file.   No Known Allergies     Exam and Labs:  /70   Pulse 56   Resp 16   Ht 1.778 m (5' 10\")   Wt 80.7 kg (178 lb)   SpO2 97%   BMI 25.54 kg/m²    @Constitutional:  NAD, comfortable  Head: NC,AT. Eyes: No scleral icterus. Neck:  Neck supple. No JVD present.Throat: moist mucous membranes.  Chest: Effort normal & normal respiratory excursion .Neurological: alert, conversant and oriented . Skin: Skin is not cold. No obvious systemic rash noted. Not diaphoretic. No erythema.   Psychiatric:  Grossly normal mood and affect.  Behavior appears normal.   Extremities:  no clubbing or cyanosis. Abdomen: non distended    Lungs:breath sounds normal. No stridor. distress, wheezes or  Rales.  Heart:    no murmurs noted, no gallops noted, irregularly irregular rhythm with rate 50s, no JVD , PMI non

## 2025-05-14 ENCOUNTER — RESULTS FOLLOW-UP (OUTPATIENT)
Dept: PRIMARY CARE CLINIC | Facility: CLINIC | Age: 89
End: 2025-05-14

## 2025-06-03 ENCOUNTER — RESULTS FOLLOW-UP (OUTPATIENT)
Age: 89
End: 2025-06-03

## 2025-06-03 ENCOUNTER — OFFICE VISIT (OUTPATIENT)
Age: 89
End: 2025-06-03

## 2025-06-03 VITALS
HEIGHT: 70 IN | WEIGHT: 176 LBS | HEART RATE: 56 BPM | BODY MASS INDEX: 25.2 KG/M2 | OXYGEN SATURATION: 95 % | DIASTOLIC BLOOD PRESSURE: 58 MMHG | SYSTOLIC BLOOD PRESSURE: 100 MMHG

## 2025-06-03 DIAGNOSIS — I50.33 ACUTE ON CHRONIC HEART FAILURE WITH PRESERVED EJECTION FRACTION (HFPEF) (HCC): ICD-10-CM

## 2025-06-03 DIAGNOSIS — R60.0 LOWER EXTREMITY EDEMA: ICD-10-CM

## 2025-06-03 DIAGNOSIS — R79.89 ELEVATED BRAIN NATRIURETIC PEPTIDE (BNP) LEVEL: ICD-10-CM

## 2025-06-03 DIAGNOSIS — Z79.01: ICD-10-CM

## 2025-06-03 DIAGNOSIS — I48.20 ATRIAL FIBRILLATION, CHRONIC (HCC): ICD-10-CM

## 2025-06-03 DIAGNOSIS — I50.33 ACUTE ON CHRONIC HEART FAILURE WITH PRESERVED EJECTION FRACTION (HFPEF) (HCC): Primary | ICD-10-CM

## 2025-06-03 LAB
ANION GAP SERPL CALC-SCNC: 6 MMOL/L (ref 2–12)
BUN SERPL-MCNC: 23 MG/DL (ref 6–20)
BUN/CREAT SERPL: 17 (ref 12–20)
CALCIUM SERPL-MCNC: 9.3 MG/DL (ref 8.5–10.1)
CHLORIDE SERPL-SCNC: 107 MMOL/L (ref 97–108)
CO2 SERPL-SCNC: 29 MMOL/L (ref 21–32)
CREAT SERPL-MCNC: 1.35 MG/DL (ref 0.7–1.3)
GLUCOSE SERPL-MCNC: 86 MG/DL (ref 65–100)
NT PRO BNP: 2072 PG/ML
POTASSIUM SERPL-SCNC: 4.3 MMOL/L (ref 3.5–5.1)
SODIUM SERPL-SCNC: 142 MMOL/L (ref 136–145)

## 2025-06-03 RX ORDER — FUROSEMIDE 20 MG/1
20 TABLET ORAL EVERY OTHER DAY
Qty: 45 TABLET | Refills: 3 | Status: SHIPPED | OUTPATIENT
Start: 2025-06-03

## 2025-06-03 ASSESSMENT — PATIENT HEALTH QUESTIONNAIRE - PHQ9
SUM OF ALL RESPONSES TO PHQ QUESTIONS 1-9: 0
SUM OF ALL RESPONSES TO PHQ QUESTIONS 1-9: 0
2. FEELING DOWN, DEPRESSED OR HOPELESS: NOT AT ALL
SUM OF ALL RESPONSES TO PHQ QUESTIONS 1-9: 0
SUM OF ALL RESPONSES TO PHQ QUESTIONS 1-9: 0
1. LITTLE INTEREST OR PLEASURE IN DOING THINGS: NOT AT ALL

## 2025-06-03 NOTE — PATIENT INSTRUCTIONS
Labs today downstairs.    We may increase Lasix to every day for a week.     We will see you back in 2-3 months.

## 2025-06-03 NOTE — PROGRESS NOTES
1. Have you been to the ER, urgent care clinic since your last visit?  Hospitalized since your last visit?  2/25 foot injury [facility]  3/19/25 leg swelling [facility] Emergency  4/25 Laceration [facility] Emergency    2. Have you seen or consulted any other health care providers outside of the [facility] since your last visit?  Include any pap smears or colon screening.   [facility]  
reported no symptoms during the stress test.    Indication: Chest pain.    A Lexiscan myocardial SPECT gated wall motion study was performed utilizing 10.6 mCi of Tc Cardiolite for rest and 21.1 mCi for stress.    SPECT imaging at stress and rest demonstrates no definite evidence of ischemia and/or infarction.    Left ventricular ejection fraction equals 64%. Left ventricular wall motion and thickening are within normal limits.    Impression  :  1. No definite evidence of ischemia and/or infarction.  2. LVEF equals 64%. Left ventricular wall motion and thickening is normal.    Signed by: Karl Miner MD on 9/2/2022  2:34 PM, Signed by: Unknown Provider Result on 9/2/2022 12:00 AM     Future Appointments   Date Time Provider Department Center   7/28/2025  9:00 AM Brian Arnold MD [facility] AM[facility]   8/26/2025  1:40 PM Jose L Ya MD [facility]  AMB      Patient Care Team:  Yulissa Rivera DO as PCP - General  Yulissa Rivera DO as PCP - Empaneled Provider  Jose L Ya MD as Consulting Physician (Cardiovascular Disease)   ROS-except as noted above.. A complete cardiac and respiratory are reviewed and negative except as above ; Resp-denies wheezing  or productive cough,. Const- No unusual weight loss or fever; Neuro-no recent seizure or CVA ; GI- No BRBPR, abdom pain, bloating ; - no  hematuria   see supplement sheet, initialed and to be scanned by staff    Past Medical History:   Diagnosis Date    Atrial fibrillation (HCC)     DVT (deep venous thrombosis) (HCC)       Social Hx= reports that he has never smoked. He has never used smokeless tobacco. He reports that he does not drink alcohol and does not use drugs.   Family Hx- family history is not on file. See above  No Known Allergies     Exam and Labs:  BP (!) 100/58 (BP Site: Left Upper Arm, Patient Position: Sitting)   Pulse 56   Ht 1.778 m (5' 10\")   Wt 79.8 kg (176 lb)   SpO2 95%   BMI 25.25 kg/m²    @Constitutional:  NAD, comfortable  Head:

## 2025-06-04 NOTE — RESULT ENCOUNTER NOTE
Derrick please call him to let them know that the blood test indicate the BNP is elevated which fits with the swelling being from his heart dysfunction.  His kidney function is quite stable.  So we have the ability to increase the diuretic.  I would like him to take the Lasix every day for 1 week and then go to 4 days a week and update us before he leaves on his trip if that seems to improve his breathing and edema.

## 2025-09-04 ENCOUNTER — OFFICE VISIT (OUTPATIENT)
Age: 89
End: 2025-09-04
Payer: MEDICARE

## 2025-09-04 VITALS
HEART RATE: 53 BPM | BODY MASS INDEX: 25.05 KG/M2 | HEIGHT: 70 IN | SYSTOLIC BLOOD PRESSURE: 124 MMHG | WEIGHT: 175 LBS | DIASTOLIC BLOOD PRESSURE: 62 MMHG | OXYGEN SATURATION: 96 %

## 2025-09-04 DIAGNOSIS — I48.20 ATRIAL FIBRILLATION, CHRONIC (HCC): Primary | ICD-10-CM

## 2025-09-04 DIAGNOSIS — R00.1 BRADYCARDIA: ICD-10-CM

## 2025-09-04 DIAGNOSIS — I50.33 ACUTE ON CHRONIC HEART FAILURE WITH PRESERVED EJECTION FRACTION (HFPEF) (HCC): ICD-10-CM

## 2025-09-04 PROCEDURE — G8419 CALC BMI OUT NRM PARAM NOF/U: HCPCS | Performed by: HOSPITALIST

## 2025-09-04 PROCEDURE — 99204 OFFICE O/P NEW MOD 45 MIN: CPT | Performed by: HOSPITALIST

## 2025-09-04 PROCEDURE — 1123F ACP DISCUSS/DSCN MKR DOCD: CPT | Performed by: HOSPITALIST

## 2025-09-04 PROCEDURE — G8427 DOCREV CUR MEDS BY ELIG CLIN: HCPCS | Performed by: HOSPITALIST

## 2025-09-04 PROCEDURE — 1036F TOBACCO NON-USER: CPT | Performed by: HOSPITALIST

## 2025-09-04 PROCEDURE — 93005 ELECTROCARDIOGRAM TRACING: CPT | Performed by: HOSPITALIST

## 2025-09-04 PROCEDURE — 1159F MED LIST DOCD IN RCRD: CPT | Performed by: HOSPITALIST

## 2025-09-04 PROCEDURE — 93010 ELECTROCARDIOGRAM REPORT: CPT | Performed by: HOSPITALIST

## 2025-09-04 PROCEDURE — 1126F AMNT PAIN NOTED NONE PRSNT: CPT | Performed by: HOSPITALIST

## 2025-09-04 ASSESSMENT — PATIENT HEALTH QUESTIONNAIRE - PHQ9
SUM OF ALL RESPONSES TO PHQ QUESTIONS 1-9: 0
SUM OF ALL RESPONSES TO PHQ QUESTIONS 1-9: 0
1. LITTLE INTEREST OR PLEASURE IN DOING THINGS: NOT AT ALL
SUM OF ALL RESPONSES TO PHQ QUESTIONS 1-9: 0
2. FEELING DOWN, DEPRESSED OR HOPELESS: NOT AT ALL
SUM OF ALL RESPONSES TO PHQ QUESTIONS 1-9: 0

## 2025-09-04 ASSESSMENT — LIFESTYLE VARIABLES
HOW OFTEN DO YOU HAVE A DRINK CONTAINING ALCOHOL: NEVER
HOW MANY STANDARD DRINKS CONTAINING ALCOHOL DO YOU HAVE ON A TYPICAL DAY: PATIENT DOES NOT DRINK

## (undated) DEVICE — FORCEPS BX L240CM JAW DIA2.4MM ORNG L CAP W/ NDL DISP RAD